# Patient Record
Sex: FEMALE | Employment: OTHER | URBAN - METROPOLITAN AREA
[De-identification: names, ages, dates, MRNs, and addresses within clinical notes are randomized per-mention and may not be internally consistent; named-entity substitution may affect disease eponyms.]

---

## 2017-05-30 ENCOUNTER — HOSPITAL ENCOUNTER (EMERGENCY)
Age: 77
Discharge: HOME | End: 2017-05-30
Attending: EMERGENCY MEDICINE
Payer: MEDICARE

## 2017-05-30 ENCOUNTER — APPOINTMENT (OUTPATIENT)
Dept: CT IMAGING | Age: 77
End: 2017-05-30
Attending: EMERGENCY MEDICINE
Payer: MEDICARE

## 2017-05-30 VITALS
RESPIRATION RATE: 17 BRPM | HEIGHT: 60 IN | TEMPERATURE: 98.3 F | BODY MASS INDEX: 29.84 KG/M2 | OXYGEN SATURATION: 98 % | DIASTOLIC BLOOD PRESSURE: 84 MMHG | WEIGHT: 152 LBS | SYSTOLIC BLOOD PRESSURE: 160 MMHG | HEART RATE: 88 BPM

## 2017-05-30 DIAGNOSIS — S20.219A CONTUSION OF CHEST WALL, UNSPECIFIED LATERALITY, INITIAL ENCOUNTER: Primary | ICD-10-CM

## 2017-05-30 LAB
ANION GAP BLD CALC-SCNC: 14 MMOL/L (ref 5–15)
ATRIAL RATE: 87 BPM
BUN BLD-MCNC: 27 MG/DL (ref 9–20)
CA-I BLD-MCNC: 1.09 MMOL/L (ref 1.12–1.32)
CALCULATED P AXIS, ECG09: 0 DEGREES
CALCULATED R AXIS, ECG10: 20 DEGREES
CALCULATED T AXIS, ECG11: -154 DEGREES
CHLORIDE BLD-SCNC: 103 MMOL/L (ref 98–107)
CO2 BLD-SCNC: 27 MMOL/L (ref 21–32)
CREAT BLD-MCNC: 0.9 MG/DL (ref 0.6–1.3)
DIAGNOSIS, 93000: NORMAL
GLUCOSE BLD-MCNC: 130 MG/DL (ref 65–100)
HCT VFR BLD CALC: 38 % (ref 35–47)
HGB BLD-MCNC: 12.9 GM/DL (ref 11.5–16)
P-R INTERVAL, ECG05: 152 MS
POTASSIUM BLD-SCNC: 5.3 MMOL/L (ref 3.5–5.1)
Q-T INTERVAL, ECG07: 362 MS
QRS DURATION, ECG06: 70 MS
QTC CALCULATION (BEZET), ECG08: 435 MS
SERVICE CMNT-IMP: ABNORMAL
SODIUM BLD-SCNC: 138 MMOL/L (ref 136–145)
TROPONIN I SERPL-MCNC: <0.04 NG/ML
VENTRICULAR RATE, ECG03: 87 BPM

## 2017-05-30 PROCEDURE — 71260 CT THORAX DX C+: CPT

## 2017-05-30 PROCEDURE — 74011250636 HC RX REV CODE- 250/636: Performed by: EMERGENCY MEDICINE

## 2017-05-30 PROCEDURE — 74011000258 HC RX REV CODE- 258: Performed by: EMERGENCY MEDICINE

## 2017-05-30 PROCEDURE — 80047 BASIC METABLC PNL IONIZED CA: CPT

## 2017-05-30 PROCEDURE — 36415 COLL VENOUS BLD VENIPUNCTURE: CPT | Performed by: EMERGENCY MEDICINE

## 2017-05-30 PROCEDURE — 93005 ELECTROCARDIOGRAM TRACING: CPT

## 2017-05-30 PROCEDURE — 84484 ASSAY OF TROPONIN QUANT: CPT | Performed by: EMERGENCY MEDICINE

## 2017-05-30 PROCEDURE — 74011636320 HC RX REV CODE- 636/320: Performed by: EMERGENCY MEDICINE

## 2017-05-30 PROCEDURE — 99284 EMERGENCY DEPT VISIT MOD MDM: CPT

## 2017-05-30 RX ORDER — SODIUM CHLORIDE 0.9 % (FLUSH) 0.9 %
10 SYRINGE (ML) INJECTION
Status: COMPLETED | OUTPATIENT
Start: 2017-05-30 | End: 2017-05-30

## 2017-05-30 RX ORDER — SODIUM CHLORIDE 9 MG/ML
1000 INJECTION, SOLUTION INTRAVENOUS ONCE
Status: COMPLETED | OUTPATIENT
Start: 2017-05-30 | End: 2017-05-30

## 2017-05-30 RX ADMIN — SODIUM CHLORIDE 100 ML: 900 INJECTION, SOLUTION INTRAVENOUS at 15:47

## 2017-05-30 RX ADMIN — IOPAMIDOL 100 ML: 612 INJECTION, SOLUTION INTRAVENOUS at 15:47

## 2017-05-30 RX ADMIN — SODIUM CHLORIDE 1000 ML: 900 INJECTION, SOLUTION INTRAVENOUS at 15:21

## 2017-05-30 RX ADMIN — Medication 10 ML: at 15:47
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
## 2017-05-30 NOTE — ED NOTES
Patient verbalizes understanding of discharge instructions. Ambulatory and in no acute distress at discharge. Accompanied by female .

## 2017-08-23 PROBLEM — I77.9 UNILATERAL CAROTID ARTERY DISEASE (HCC): Status: ACTIVE | Noted: 2017-01-01

## 2017-10-12 ENCOUNTER — APPOINTMENT (OUTPATIENT)
Dept: MRI IMAGING | Age: 77
End: 2017-10-12
Attending: INTERNAL MEDICINE
Payer: MEDICARE

## 2017-10-12 ENCOUNTER — APPOINTMENT (OUTPATIENT)
Dept: CT IMAGING | Age: 77
End: 2017-10-12
Attending: EMERGENCY MEDICINE
Payer: MEDICARE

## 2017-10-12 ENCOUNTER — HOSPITAL ENCOUNTER (OUTPATIENT)
Age: 77
Setting detail: OBSERVATION
Discharge: HOME | End: 2017-10-13
Attending: EMERGENCY MEDICINE | Admitting: INTERNAL MEDICINE
Payer: MEDICARE

## 2017-10-12 DIAGNOSIS — H81.11 BENIGN PAROXYSMAL POSITIONAL VERTIGO OF RIGHT EAR: ICD-10-CM

## 2017-10-12 DIAGNOSIS — R03.0 ELEVATED BLOOD PRESSURE READING: ICD-10-CM

## 2017-10-12 DIAGNOSIS — R51.9 NONINTRACTABLE HEADACHE, UNSPECIFIED CHRONICITY PATTERN, UNSPECIFIED HEADACHE TYPE: ICD-10-CM

## 2017-10-12 DIAGNOSIS — R42 VERTIGO: Primary | ICD-10-CM

## 2017-10-12 PROBLEM — I15.2 HYPERTENSION COMPLICATING DIABETES (HCC): Status: ACTIVE | Noted: 2017-10-12

## 2017-10-12 PROBLEM — E11.8 TYPE 2 DIABETES MELLITUS WITH COMPLICATION (HCC): Status: ACTIVE | Noted: 2017-10-12

## 2017-10-12 PROBLEM — E11.59 HYPERTENSION COMPLICATING DIABETES (HCC): Status: ACTIVE | Noted: 2017-10-12

## 2017-10-12 LAB
ALBUMIN SERPL-MCNC: 4.1 G/DL (ref 3.5–5)
ALBUMIN/GLOB SERPL: 0.9 {RATIO} (ref 1.1–2.2)
ALP SERPL-CCNC: 73 U/L (ref 45–117)
ALT SERPL-CCNC: 21 U/L (ref 12–78)
ANION GAP SERPL CALC-SCNC: 8 MMOL/L (ref 5–15)
AST SERPL-CCNC: 21 U/L (ref 15–37)
ATRIAL RATE: 87 BPM
BASOPHILS # BLD: 0 K/UL (ref 0–0.1)
BASOPHILS NFR BLD: 0 % (ref 0–1)
BILIRUB SERPL-MCNC: 0.6 MG/DL (ref 0.2–1)
BUN SERPL-MCNC: 14 MG/DL (ref 6–20)
BUN/CREAT SERPL: 16 (ref 12–20)
CALCIUM SERPL-MCNC: 9.4 MG/DL (ref 8.5–10.1)
CALCULATED P AXIS, ECG09: 63 DEGREES
CALCULATED R AXIS, ECG10: 47 DEGREES
CALCULATED T AXIS, ECG11: -154 DEGREES
CHLORIDE SERPL-SCNC: 103 MMOL/L (ref 97–108)
CO2 SERPL-SCNC: 29 MMOL/L (ref 21–32)
CREAT SERPL-MCNC: 0.9 MG/DL (ref 0.55–1.02)
DIAGNOSIS, 93000: NORMAL
DIFFERENTIAL METHOD BLD: ABNORMAL
EOSINOPHIL # BLD: 0 K/UL (ref 0–0.4)
EOSINOPHIL NFR BLD: 1 % (ref 0–7)
ERYTHROCYTE [DISTWIDTH] IN BLOOD BY AUTOMATED COUNT: 13 % (ref 11.5–14.5)
GLOBULIN SER CALC-MCNC: 4.4 G/DL (ref 2–4)
GLUCOSE BLD STRIP.AUTO-MCNC: 166 MG/DL (ref 65–100)
GLUCOSE SERPL-MCNC: 157 MG/DL (ref 65–100)
HCT VFR BLD AUTO: 40 % (ref 35–47)
HGB BLD-MCNC: 12.2 G/DL (ref 11.5–16)
LYMPHOCYTES # BLD: 0.6 K/UL (ref 0.8–3.5)
LYMPHOCYTES NFR BLD: 14 % (ref 12–49)
MCH RBC QN AUTO: 24 PG (ref 26–34)
MCHC RBC AUTO-ENTMCNC: 30.5 G/DL (ref 30–36.5)
MCV RBC AUTO: 78.6 FL (ref 80–99)
MONOCYTES # BLD: 0.1 K/UL (ref 0–1)
MONOCYTES NFR BLD: 3 % (ref 5–13)
NEUTS SEG # BLD: 3.7 K/UL (ref 1.8–8)
NEUTS SEG NFR BLD: 82 % (ref 32–75)
P-R INTERVAL, ECG05: 150 MS
PLATELET # BLD AUTO: 457 K/UL (ref 150–400)
PLATELET COMMENTS,PCOM: ABNORMAL
POTASSIUM SERPL-SCNC: 4.1 MMOL/L (ref 3.5–5.1)
PROT SERPL-MCNC: 8.5 G/DL (ref 6.4–8.2)
Q-T INTERVAL, ECG07: 348 MS
QRS DURATION, ECG06: 74 MS
QTC CALCULATION (BEZET), ECG08: 418 MS
RBC # BLD AUTO: 5.09 M/UL (ref 3.8–5.2)
RBC MORPH BLD: ABNORMAL
SERVICE CMNT-IMP: ABNORMAL
SODIUM SERPL-SCNC: 140 MMOL/L (ref 136–145)
TROPONIN I SERPL-MCNC: <0.04 NG/ML
VENTRICULAR RATE, ECG03: 87 BPM
WBC # BLD AUTO: 4.4 K/UL (ref 3.6–11)

## 2017-10-12 PROCEDURE — 93005 ELECTROCARDIOGRAM TRACING: CPT

## 2017-10-12 PROCEDURE — 82962 GLUCOSE BLOOD TEST: CPT

## 2017-10-12 PROCEDURE — 96374 THER/PROPH/DIAG INJ IV PUSH: CPT

## 2017-10-12 PROCEDURE — 70450 CT HEAD/BRAIN W/O DYE: CPT

## 2017-10-12 PROCEDURE — 80053 COMPREHEN METABOLIC PANEL: CPT | Performed by: EMERGENCY MEDICINE

## 2017-10-12 PROCEDURE — 70544 MR ANGIOGRAPHY HEAD W/O DYE: CPT

## 2017-10-12 PROCEDURE — 96361 HYDRATE IV INFUSION ADD-ON: CPT

## 2017-10-12 PROCEDURE — 82306 VITAMIN D 25 HYDROXY: CPT | Performed by: INTERNAL MEDICINE

## 2017-10-12 PROCEDURE — 85025 COMPLETE CBC W/AUTO DIFF WBC: CPT | Performed by: EMERGENCY MEDICINE

## 2017-10-12 PROCEDURE — 70553 MRI BRAIN STEM W/O & W/DYE: CPT

## 2017-10-12 PROCEDURE — 74011250636 HC RX REV CODE- 250/636: Performed by: INTERNAL MEDICINE

## 2017-10-12 PROCEDURE — 84484 ASSAY OF TROPONIN QUANT: CPT | Performed by: EMERGENCY MEDICINE

## 2017-10-12 PROCEDURE — 99218 HC RM OBSERVATION: CPT

## 2017-10-12 PROCEDURE — 99284 EMERGENCY DEPT VISIT MOD MDM: CPT

## 2017-10-12 PROCEDURE — A9576 INJ PROHANCE MULTIPACK: HCPCS | Performed by: EMERGENCY MEDICINE

## 2017-10-12 PROCEDURE — 74011250636 HC RX REV CODE- 250/636: Performed by: EMERGENCY MEDICINE

## 2017-10-12 PROCEDURE — 36415 COLL VENOUS BLD VENIPUNCTURE: CPT | Performed by: EMERGENCY MEDICINE

## 2017-10-12 PROCEDURE — 93880 EXTRACRANIAL BILAT STUDY: CPT

## 2017-10-12 PROCEDURE — 96375 TX/PRO/DX INJ NEW DRUG ADDON: CPT

## 2017-10-12 PROCEDURE — 74011250637 HC RX REV CODE- 250/637: Performed by: EMERGENCY MEDICINE

## 2017-10-12 RX ORDER — KETOROLAC TROMETHAMINE 30 MG/ML
15 INJECTION, SOLUTION INTRAMUSCULAR; INTRAVENOUS
Status: COMPLETED | OUTPATIENT
Start: 2017-10-12 | End: 2017-10-12

## 2017-10-12 RX ORDER — MECLIZINE HCL 12.5 MG 12.5 MG/1
25 TABLET ORAL
Status: COMPLETED | OUTPATIENT
Start: 2017-10-12 | End: 2017-10-12

## 2017-10-12 RX ORDER — ACETAMINOPHEN 325 MG/1
650 TABLET ORAL
Status: DISCONTINUED | OUTPATIENT
Start: 2017-10-12 | End: 2017-10-13 | Stop reason: HOSPADM

## 2017-10-12 RX ORDER — RAMIPRIL 2.5 MG/1
20 CAPSULE ORAL DAILY
Status: DISCONTINUED | OUTPATIENT
Start: 2017-10-13 | End: 2017-10-13 | Stop reason: HOSPADM

## 2017-10-12 RX ORDER — ONDANSETRON 2 MG/ML
4 INJECTION INTRAMUSCULAR; INTRAVENOUS
Status: COMPLETED | OUTPATIENT
Start: 2017-10-12 | End: 2017-10-12

## 2017-10-12 RX ORDER — INSULIN LISPRO 100 [IU]/ML
INJECTION, SOLUTION INTRAVENOUS; SUBCUTANEOUS
Status: DISCONTINUED | OUTPATIENT
Start: 2017-10-12 | End: 2017-10-13 | Stop reason: HOSPADM

## 2017-10-12 RX ORDER — ACETAMINOPHEN 500 MG
1000 TABLET ORAL
Status: COMPLETED | OUTPATIENT
Start: 2017-10-12 | End: 2017-10-12

## 2017-10-12 RX ORDER — GARLIC 1000 MG
1000 CAPSULE ORAL DAILY
COMMUNITY
End: 2017-10-19

## 2017-10-12 RX ORDER — MECLIZINE HCL 12.5 MG 12.5 MG/1
25 TABLET ORAL
Status: DISCONTINUED | OUTPATIENT
Start: 2017-10-12 | End: 2017-10-13 | Stop reason: HOSPADM

## 2017-10-12 RX ORDER — SODIUM CHLORIDE 9 MG/ML
100 INJECTION, SOLUTION INTRAVENOUS CONTINUOUS
Status: DISCONTINUED | OUTPATIENT
Start: 2017-10-12 | End: 2017-10-13

## 2017-10-12 RX ORDER — SODIUM CHLORIDE 0.9 % (FLUSH) 0.9 %
5-10 SYRINGE (ML) INJECTION EVERY 8 HOURS
Status: DISCONTINUED | OUTPATIENT
Start: 2017-10-12 | End: 2017-10-13 | Stop reason: HOSPADM

## 2017-10-12 RX ORDER — SODIUM CHLORIDE 0.9 % (FLUSH) 0.9 %
5-10 SYRINGE (ML) INJECTION AS NEEDED
Status: DISCONTINUED | OUTPATIENT
Start: 2017-10-12 | End: 2017-10-13 | Stop reason: HOSPADM

## 2017-10-12 RX ADMIN — ACETAMINOPHEN 1000 MG: 500 TABLET, FILM COATED ORAL at 14:51

## 2017-10-12 RX ADMIN — Medication 10 ML: at 21:51

## 2017-10-12 RX ADMIN — GADOTERIDOL 14 ML: 279.3 INJECTION, SOLUTION INTRAVENOUS at 19:45

## 2017-10-12 RX ADMIN — SODIUM CHLORIDE 100 ML/HR: 900 INJECTION, SOLUTION INTRAVENOUS at 21:51

## 2017-10-12 RX ADMIN — SODIUM CHLORIDE 1000 ML: 900 INJECTION, SOLUTION INTRAVENOUS at 13:15

## 2017-10-12 RX ADMIN — KETOROLAC TROMETHAMINE 15 MG: 30 INJECTION, SOLUTION INTRAMUSCULAR at 13:50

## 2017-10-12 RX ADMIN — MECLIZINE 25 MG: 12.5 TABLET ORAL at 13:15

## 2017-10-12 RX ADMIN — ONDANSETRON 4 MG: 2 INJECTION INTRAMUSCULAR; INTRAVENOUS at 13:15

## 2017-10-12 NOTE — IP AVS SNAPSHOT
[address] [facility]. [address] 
[phone] Patient: Larry Goddard MRN: [id number] FMX:[id number] Current Discharge Medication List  
  
ASK your doctor about these medications Dose & Instructions Dispensing Information Comments Morning Noon Evening Bedtime  
 garlic 4,544 mg Cap Your last dose was: Your next dose is:    
   
   
 Dose:  1000 mg Take 1,000 mg by mouth daily. Refills:  0  
     
   
   
   
  
 metFORMIN 500 mg tablet Commonly known as:  GLUCOPHAGE Your last dose was: Your next dose is:    
   
   
 Dose:  500 mg Take 1 Tab by mouth daily (with breakfast). Quantity:  30 Tab Refills:  12  
     
   
   
   
  
 ramipril 10 mg capsule Commonly known as:  ALTACE Your last dose was: Your next dose is:    
   
   
 Dose:  20 mg Take 2 Caps by mouth daily. Quantity:  60 Cap Refills:  12

## 2017-10-12 NOTE — ED PROVIDER NOTES
HPI Comments: 77yo F with pmh sig for DM, htn who presents with vertigo. Started last night. Balance off and nauseated. Symptoms worse when moving head. No vomiting.  + headache. No fever, chest pain, sob, ear pain, changing in hearing. Never had symptoms like this before. No medications taken at home for these symptoms. Patient is a 69 y.o. female presenting with dizziness, headaches, fatigue, and nausea. The history is provided by the patient and a friend. Dizziness   Associated symptoms include headaches and nausea. Pertinent negatives include no chest pain. Headache    Associated symptoms include dizziness and nausea. Pertinent negatives include no fever. Fatigue   Associated symptoms include headaches and nausea. Pertinent negatives include no chest pain. Nausea    Associated symptoms include headaches and headaches. Pertinent negatives include no fever, no abdominal pain and no arthralgias. Past Medical History:   Diagnosis Date    Diabetes ([facility] [address]     Hypertension     Unilateral carotid artery disease ([facility] [address] 2017    50-79 % stenosis Left ICA . Past Surgical History:   Procedure Laterality Date    HX GYN      Hysterectomy         History reviewed. No pertinent family history. Social History     Social History    Marital status: UNKNOWN     Spouse name: N/A    Number of children: N/A    Years of education: N/A     Occupational History    Not on file. Social History Main Topics    Smoking status: Never Smoker    Smokeless tobacco: Never Used    Alcohol use No    Drug use: No    Sexual activity: Not on file     Other Topics Concern    Not on file     Social History Narrative         ALLERGIES: Lipitor [atorvastatin]    Review of Systems   Constitutional: Positive for fatigue. Negative for fever. HENT: Negative for facial swelling. Eyes: Negative for visual disturbance. Respiratory: Negative for chest tightness. Cardiovascular: Negative for chest pain. Gastrointestinal: Positive for nausea. Negative for abdominal pain. Genitourinary: Negative for dysuria. Musculoskeletal: Negative for arthralgias. Skin: Negative for rash. Neurological: Positive for dizziness and headaches. Hematological: Negative for adenopathy. Psychiatric/Behavioral: Negative for suicidal ideas. Vitals:    10/12/17 1201   BP: 173/81   Pulse: 86   Resp: 18   Temp: 98.3 °F (36.8 °C)   SpO2: 96%   Weight: 72.6 kg (160 lb)   Height: 5' (1.524 m)            Physical Exam   Constitutional: She is oriented to person, place, and time. She appears well-developed and well-nourished. No distress. HENT:   Head: Normocephalic and atraumatic. Mouth/Throat: Oropharynx is clear and moist.   Eyes: Pupils are equal, round, and reactive to light. No scleral icterus. Neck: Normal range of motion. Neck supple. No thyromegaly present. Cardiovascular: Normal rate, regular rhythm, normal heart sounds and intact distal pulses. No murmur heard. Pulmonary/Chest: Effort normal and breath sounds normal. No respiratory distress. Abdominal: Soft. Bowel sounds are normal. She exhibits no distension. There is no tenderness. Musculoskeletal: Normal range of motion. She exhibits no edema. Neurological: She is alert and oriented to person, place, and time. Mental Status:  Oriented to time, place and person. Cranial Nerves: Intact visual fields. Fundi are benign. PERRL, EOM's full and intact, no nystagmus, no ptosis. Facial sensation is normal. Facial movement is symmetric. Palate is midline with normal sternocleidomastoid and trapezius muscle strength. Tongue is midline. Motor:  5/5 strength in upper and lower proximal and distal muscles. Normal bulk and tone. Reflexes:  Biceps and quadriceps tendon reflexes 2+ and symmetrical.     Sensory:  Normal to light touch    Gait:   Normal gait. Cerebellar:  Normal finger-to-nose and heal to shin. Negative Romberg.      Skin: Skin is warm and dry. No rash noted. She is not diaphoretic. Nursing note and vitals reviewed. MDM  Number of Diagnoses or Management Options  Diagnosis management comments: A:  77yo F with vertigo. Most likely peripheral etiology. No other CNS symptoms to suggest central cause and symptoms worsened with movement which support peripheral cause. VS stable in ED. DDx:  veritigo (peripheral vs central), dehydration, electrolyte abnormality, arrhythmia    P:  ecg  Head ct  Labs  ivf  Zofran/meclizine/toradol    ED Course       Procedures    ED EKG interpretation:  Rhythm: normal sinus rhythm. Rate (approx.): 87.  Axis: normal.  ST segment:  No concerning ST elevations or depressions. This EKG was interpreted by Carlos Dunbar MD,ED Provider. Head CT: IMPRESSION: No acute process. Labs unremarkable. 2:48 PM  Pt with no improvement in symptoms. BP remains elevated. Will give tylenol for headache. I discussed with Dr. Esmre Da Silva who will eval patient for admission.

## 2017-10-12 NOTE — PROGRESS NOTES
Vertigo not responding to IVF , Meclizine. Referred for Observation . Orders placed.  I will see this evening

## 2017-10-12 NOTE — H&P
[address]   174 [facility], [address] [place] [address]   HISTORY AND PHYSICAL       Name:  Nery Zayas   MR#:  [id number]   :  1940   Account #:  [de-identified]        Date of Adm:  10/12/2017       CHIEF COMPLAINT: A 78-year-old black female referred to   observation for evaluation of vertigo. HISTORY OF PRESENT ILLNESS: The patient has hypertension, type   2 diabetes mellitus. She awoke yesterday morning with vertigo, tried to   eat a bland diet and vomited associated with development of a   headache which persists. She came to the ER and was given IV fluids   and meclizine and continues to feel have vertigo . Head CT was negative. She is referred to observation for further evaluation and treatment. PAST MEDICAL HISTORY: Type 2 diabetes mellitus, hypertension   complicating diabetes. carotid artery disease with a    carotid Doppler showing 50-79% left internal carotid artery stenosis. Episode of syncope May , 2017 of unclear etiology. Then , she   was taking her blood pressure medication incorrectly. Sciatica. PAST SURGICAL HISTORY: Hysterectomy. MEDICATIONS   1. Ramipril 20 mg daily. 2. Garlic 0166 mg daily. 3. Metformin 500 mg daily. ADVERSE DRUG REACTIONS: LIPITOR (LEG CRAMPS). FAMILY HISTORY: Noncontributory. SOCIAL HISTORY: Never a smoker, negative ETOH. REVIEW OF SYSTEMS: She denies acute focal neurologic symptoms. She had one episode of near syncope during the past 36 hours. Denies chest pain, palpitations, head injury, loss of consciousness,   sudden hearing loss. She denies rectal bleeding. PHYSICAL EXAMINATION   VITAL SIGNS: She is not orthostatic. Systolic blood pressure 182 lying,   189 standing, with pulse remaining stable in the 80-81 range, this is   after having received a liter of fluid. Temperature 98.3, earlier pulse was   86, /81, RR 18 and 96% saturated. HEENT: Ears: TMs clear. No nystagmus, PERRLA.    NECK: No carotid bruits heard. Neck is supple, 2+ temporal arteries,   nontender. LUNGS: Clear. HEART: Regular without murmur, gallop, or rub. ABDOMEN: Negative. EXTREMITIES: No pedal edema. No pedal lesions seen, 2+ DP   pulses. Light touch sensation intact in her feet. NEUROLOGIC: Borderline Romberg. Negative drift. Gait is mildly   ataxic. Otherwise, neurologic exam is nonfocal.    LABORATORY DATA: Urinalysis pending. Glucose 157, BUN 14,   creatinine 0.9, total protein 8.5, albumin 4.1, globulin 4.4. Remainder of   CMP is normal. WBC 4.4, hemoglobin 12.2, and platelet count 942   with 87% neutrophils, lymphocytes, 3% monocytes. Troponin I   negative. EKG: NSR, nonspecific T-wave change, no significant   change versus 05/30/2017 EKG. IMPRESSION   1. Acute vertigo. Under evaluation. She is ataxic. Plan: Refer   to observation for a brain MRI, MRA, carotid Doppler. 2. History of carotid artery disease. 3. Type 2 diabetes mellitus. 4. Hypertension. She did not have her ramipril today. Will hydrate and   follow fingerstick blood sugars.         MD GURPREET [facility]   D:  10/12/2017   18:18   T:  10/12/2017   19:02   Job #:  [id number]

## 2017-10-12 NOTE — PROGRESS NOTES
Admission Medication Reconciliation:    Information obtained from:  Patient, RxQuery, chart review    Comments/Recommendations: All medications/allergies have been reviewed and updated; last medication administration times reviewed and recorded. The patient was a good historian and appears to be compliant with her medications. Changes made to Prior to Admission (PTA) Medication List:   ?   Medications Added:   - None   ? Medications Changed:   - None   ? Medications Removed:   - ergocalciferol 50,000 units/week  - quinine 324 mg QHS prn leg cramos         Significant PMH/Disease States:   Past Medical History:   Diagnosis Date    [facility]     Hypertension     Unilateral carotid artery disease ([facility] 2017    50-79 % stenosis Left ICA . Chief Complaint for this Admission:    Chief Complaint   Patient presents with    Dizziness    Headache    Fatigue    Nausea       Allergies:  Lipitor [atorvastatin]    Prior to Admission Medications:   Prior to Admission Medications   Prescriptions Last Dose Informant Patient Reported? Taking?   garlic 4,205 mg cap 84/52/2208 at Unknown time  Yes Yes   Sig: Take 1,000 mg by mouth daily. metFORMIN (GLUCOPHAGE) 500 mg tablet 10/11/2017 at Unknown time  No Yes   Sig: Take 1 Tab by mouth daily (with breakfast). ramipril (ALTACE) 10 mg capsule 10/11/2017 at Unknown time  No Yes   Sig: Take 2 Caps by mouth daily. Facility-Administered Medications: None       Thank you for allowing pharmacy to participate in the coordination of this patient's care. If you have any other questions, please contact the medication reconciliation pharmacist at [phone]. Bucky Bragg, Pharm. D.

## 2017-10-12 NOTE — IP AVS SNAPSHOT
[address] 
[phone] Patient: Nimesh Fuller MRN: [id number] LYP:1/63/7051 You are allergic to the following Allergen Reactions Lipitor (Atorvastatin) Other (comments) Leg cramps Recent Documentation Height Weight BMI OB Status Smoking Status 1.524 m 72.6 kg 31.25 kg/m2 Hysterectomy Never Smoker Emergency Contacts Name Discharge Info Relation Home Work Mobile Juliano Goodman [5] [phone] Jcarlos Yaya [5] [phone] About your hospitalization You were admitted on:  October 12, 2017 You last received care in the:  [facility] 5 OBSERVATION You were discharged on:  October 13, 2017 Unit phone number:  [phone] Why you were hospitalized Your primary diagnosis was:  Not on File Your diagnoses also included:  Vertigo, Hypertension Complicating Diabetes (Hcc), Type 2 Diabetes Mellitus With Complication (Hcc), Unilateral Carotid Artery Disease (Hcc) Providers Seen During Your Hospitalizations Provider Role Specialty Primary office phone Moraima Small MD Attending Provider Emergency Medicine [phone] Romie Payne MD Attending Provider Internal Medicine [phone] Your Primary Care Physician (PCP) Primary Care Physician Office Phone Office Fax [address] [phone] Follow-up Information Follow up With Details Comments Contact Info [place]ie [place]   [id number] [address] 
[phone] Current Discharge Medication List  
  
START taking these medications Dose & Instructions Dispensing Information Comments Morning Noon Evening Bedtime  
 aspirin delayed-release 81 mg tablet Your last dose was: Your next dose is:    
   
   
 Dose:  81 mg Take 1 Tab by mouth daily. Quantity:  30 Tab Refills:  11  
     
   
   
   
  
 meclizine 12.5 mg tablet Commonly known as:  ANTIVERT Your last dose was: Your next dose is:    
   
   
 Dose:  12.5 mg Take 1 Tab by mouth three (3) times daily as needed. For dizziness Quantity:  15 Tab Refills:  0  
     
   
   
   
  
 pravastatin 10 mg tablet Commonly known as:  PRAVACHOL Your last dose was: Your next dose is:    
   
   
 Dose:  10 mg Take 1 Tab by mouth every Monday and Friday. Quantity:  10 Tab Refills:  11 CONTINUE these medications which have NOT CHANGED Dose & Instructions Dispensing Information Comments Morning Noon Evening Bedtime  
 garlic 3,279 mg Cap Your last dose was: Your next dose is:    
   
   
 Dose:  1000 mg Take 1,000 mg by mouth daily. Refills:  0  
     
   
   
   
  
 metFORMIN 500 mg tablet Commonly known as:  GLUCOPHAGE Your last dose was: Your next dose is:    
   
   
 Dose:  500 mg Take 1 Tab by mouth daily (with breakfast). Quantity:  30 Tab Refills:  12  
     
   
   
   
  
 ramipril 10 mg capsule Commonly known as:  ALTACE Your last dose was: Your next dose is:    
   
   
 Dose:  20 mg Take 2 Caps by mouth daily. Quantity:  60 Cap Refills:  12 Where to Get Your Medications These medications were sent to [address] [place] [address] [address]  [facility] [address]. [address] [place] [address], [address] [place] [address] Hours:  24-hours Phone:  [phone]  
  aspirin delayed-release 81 mg tablet  
 meclizine 12.5 mg tablet  
 pravastatin 10 mg tablet Discharge Instructions Patient Discharge Instructions Chato Aguirre / [id number] : 1940 Admitted 10/12/2017 Discharged: 10/13/2017 Take Home Medications · It is important that you take the medication exactly as they are prescribed. · Keep your medication in the bottles provided by the pharmacist and keep a list of the medication names, dosages, and times to be taken in your wallet. · Do not take other medications without consulting your doctor. What to do at [facility] Recommended diet: Diabetic Recommended activity: gradually increase activity. When you are feeling well without dizziness, you may resume driving . The first time out driving, have another person in the car If you experience any of the following symptoms : dizziness which does not respond to Meclizine , please follow up with Dr. Hernandez Signs at [phone] Follow-up with Dr. Cierra Bhatti in 3 to 5 days in his office. Call [phone] to make an appointment. Information obtained by : 
I understand that if any problems occur once I am at home I am to contact my physician. I understand and acknowledge receipt of the instructions indicated above. Physician's or R.N.'s Signature                                                                  Date/Time Patient or Representative Signature                                                          Date/Time Discharge Orders None Introducing [facility]! Stephanie Pham introduces "[employer] patient portal. Now you can access parts of your medical record, email your doctor's office, and request medication refills online. 1. In your internet browser, go to [email or web address] 2. Click on the First Time User? Click Here link in the Sign In box. You will see the New Member Sign Up page. 3. Enter your [employer] Access Code exactly as it appears below. You will not need to use this code after youve completed the sign-up process. If you do not sign up before the expiration date, you must request a new code. · [employer] Access Code: [email or web address] Expires: 11/21/2017  3:54 PM 
 
4. Enter the last four digits of your Social Security Number (xxxx) and Date of Birth (mm/dd/yyyy) as indicated and click Submit. You will be taken to the next sign-up page. 5. Create a [employer] ID. This will be your [employer] login ID and cannot be changed, so think of one that is secure and easy to remember. 6. Create a [employer] password. You can change your password at any time. 7. Enter your Password Reset Question and Answer. This can be used at a later time if you forget your password. 8. Enter your e-mail address. You will receive e-mail notification when new information is available in [address] 9. Click Sign Up. You can now view and download portions of your medical record. 10. Click the Download Summary menu link to download a portable copy of your medical information. If you have questions, please visit the Frequently Asked Questions section of the [employer] website. Remember, [employer] is NOT to be used for urgent needs. For medical emergencies, dial 911. Now available from your iPhone and Android! General Information Please provide this summary of care documentation to your next provider. Patient Signature:  ____________________________________________________________ Date:  ____________________________________________________________  
  
Albina Miranda Provider Signature:  ____________________________________________________________ Date:  ____________________________________________________________

## 2017-10-12 NOTE — ROUTINE PROCESS
TRANSFER - OUT REPORT:    Verbal report given to Jose Pham RN(name) on Liu Long  being transferred to [address](unit) for routine progression of care       Report consisted of patients Situation, Background, Assessment and   Recommendations(SBAR). Information from the following report(s) SBAR, ED Summary, OR Summary, Procedure Summary, Intake/Output, MAR and Recent Results was reviewed with the receiving nurse. Lines:   Peripheral IV 10/12/17 Right Antecubital (Active)   Site Assessment Clean, dry, & intact 10/12/2017 12:21 PM   Phlebitis Assessment 0 10/12/2017 12:21 PM   Infiltration Assessment 0 10/12/2017 12:21 PM   Dressing Status Clean, dry, & intact 10/12/2017 12:21 PM   Dressing Type Transparent 10/12/2017 12:21 PM   Hub Color/Line Status Pink;Flushed;Patent 10/12/2017 12:21 PM   Action Taken Blood drawn 10/12/2017 12:21 PM        Opportunity for questions and clarification was provided.       Patient transported with:  Transporter

## 2017-10-13 VITALS
BODY MASS INDEX: 31.41 KG/M2 | SYSTOLIC BLOOD PRESSURE: 162 MMHG | OXYGEN SATURATION: 98 % | RESPIRATION RATE: 18 BRPM | TEMPERATURE: 98.1 F | HEIGHT: 60 IN | WEIGHT: 160 LBS | HEART RATE: 72 BPM | DIASTOLIC BLOOD PRESSURE: 88 MMHG

## 2017-10-13 PROBLEM — I67.9 CEREBROVASCULAR DISEASE: Status: ACTIVE | Noted: 2017-10-13

## 2017-10-13 PROBLEM — H81.11 BENIGN PAROXYSMAL POSITIONAL VERTIGO OF RIGHT EAR: Status: ACTIVE | Noted: 2017-10-13

## 2017-10-13 LAB
25(OH)D3 SERPL-MCNC: 29.5 NG/ML (ref 30–100)
ANION GAP SERPL CALC-SCNC: 8 MMOL/L (ref 5–15)
APPEARANCE UR: ABNORMAL
BACTERIA URNS QL MICRO: NEGATIVE /HPF
BILIRUB UR QL: NEGATIVE
BUN SERPL-MCNC: 16 MG/DL (ref 6–20)
BUN/CREAT SERPL: 22 (ref 12–20)
CALCIUM SERPL-MCNC: 8.2 MG/DL (ref 8.5–10.1)
CHLORIDE SERPL-SCNC: 108 MMOL/L (ref 97–108)
CHOLEST SERPL-MCNC: 169 MG/DL
CO2 SERPL-SCNC: 27 MMOL/L (ref 21–32)
COLOR UR: ABNORMAL
CREAT SERPL-MCNC: 0.74 MG/DL (ref 0.55–1.02)
EPITH CASTS URNS QL MICRO: ABNORMAL /LPF
ERYTHROCYTE [DISTWIDTH] IN BLOOD BY AUTOMATED COUNT: 13.3 % (ref 11.5–14.5)
GLUCOSE BLD STRIP.AUTO-MCNC: 106 MG/DL (ref 65–100)
GLUCOSE BLD STRIP.AUTO-MCNC: 122 MG/DL (ref 65–100)
GLUCOSE SERPL-MCNC: 104 MG/DL (ref 65–100)
GLUCOSE UR STRIP.AUTO-MCNC: NEGATIVE MG/DL
HCT VFR BLD AUTO: 33 % (ref 35–47)
HDLC SERPL-MCNC: 37 MG/DL
HDLC SERPL: 4.6 {RATIO} (ref 0–5)
HGB BLD-MCNC: 10.1 G/DL (ref 11.5–16)
HGB UR QL STRIP: NEGATIVE
HYALINE CASTS URNS QL MICRO: ABNORMAL /LPF (ref 0–5)
KETONES UR QL STRIP.AUTO: NEGATIVE MG/DL
LDLC SERPL CALC-MCNC: 105.8 MG/DL (ref 0–100)
LEUKOCYTE ESTERASE UR QL STRIP.AUTO: ABNORMAL
LIPID PROFILE,FLP: ABNORMAL
MCH RBC QN AUTO: 24 PG (ref 26–34)
MCHC RBC AUTO-ENTMCNC: 30.6 G/DL (ref 30–36.5)
MCV RBC AUTO: 78.6 FL (ref 80–99)
NITRITE UR QL STRIP.AUTO: NEGATIVE
PH UR STRIP: 5.5 [PH] (ref 5–8)
PLATELET # BLD AUTO: 372 K/UL (ref 150–400)
POTASSIUM SERPL-SCNC: 3.7 MMOL/L (ref 3.5–5.1)
PROT UR STRIP-MCNC: NEGATIVE MG/DL
RBC # BLD AUTO: 4.2 M/UL (ref 3.8–5.2)
RBC #/AREA URNS HPF: ABNORMAL /HPF (ref 0–5)
SERVICE CMNT-IMP: ABNORMAL
SERVICE CMNT-IMP: ABNORMAL
SODIUM SERPL-SCNC: 143 MMOL/L (ref 136–145)
SP GR UR REFRACTOMETRY: 1.02 (ref 1–1.03)
TRIGL SERPL-MCNC: 131 MG/DL (ref ?–150)
UR CULT HOLD, URHOLD: NORMAL
UROBILINOGEN UR QL STRIP.AUTO: 1 EU/DL (ref 0.2–1)
VLDLC SERPL CALC-MCNC: 26.2 MG/DL
WBC # BLD AUTO: 4.3 K/UL (ref 3.6–11)
WBC URNS QL MICRO: ABNORMAL /HPF (ref 0–4)

## 2017-10-13 PROCEDURE — 85027 COMPLETE CBC AUTOMATED: CPT | Performed by: INTERNAL MEDICINE

## 2017-10-13 PROCEDURE — 81001 URINALYSIS AUTO W/SCOPE: CPT | Performed by: INTERNAL MEDICINE

## 2017-10-13 PROCEDURE — 80048 BASIC METABOLIC PNL TOTAL CA: CPT | Performed by: INTERNAL MEDICINE

## 2017-10-13 PROCEDURE — 82962 GLUCOSE BLOOD TEST: CPT

## 2017-10-13 PROCEDURE — 36415 COLL VENOUS BLD VENIPUNCTURE: CPT | Performed by: INTERNAL MEDICINE

## 2017-10-13 PROCEDURE — 80061 LIPID PANEL: CPT | Performed by: INTERNAL MEDICINE

## 2017-10-13 PROCEDURE — 99218 HC RM OBSERVATION: CPT

## 2017-10-13 PROCEDURE — 74011250637 HC RX REV CODE- 250/637: Performed by: INTERNAL MEDICINE

## 2017-10-13 RX ORDER — ASPIRIN 81 MG/1
81 TABLET ORAL DAILY
Status: DISCONTINUED | OUTPATIENT
Start: 2017-10-13 | End: 2017-10-13 | Stop reason: HOSPADM

## 2017-10-13 RX ORDER — MECLIZINE HCL 12.5 MG 12.5 MG/1
12.5 TABLET ORAL
Qty: 15 TAB | Refills: 0 | Status: SHIPPED | OUTPATIENT
Start: 2017-10-13 | End: 2020-07-23 | Stop reason: SDUPTHER

## 2017-10-13 RX ORDER — ASPIRIN 81 MG/1
81 TABLET ORAL DAILY
Qty: 30 TAB | Refills: 11 | Status: SHIPPED | OUTPATIENT
Start: 2017-10-13 | End: 2018-01-15 | Stop reason: SDUPTHER

## 2017-10-13 RX ORDER — FAMOTIDINE 20 MG/1
20 TABLET, FILM COATED ORAL 2 TIMES DAILY
Status: DISCONTINUED | OUTPATIENT
Start: 2017-10-13 | End: 2017-10-13 | Stop reason: HOSPADM

## 2017-10-13 RX ORDER — PRAVASTATIN SODIUM 10 MG/1
10 TABLET ORAL
Qty: 10 TAB | Refills: 11 | Status: SHIPPED | OUTPATIENT
Start: 2017-10-13 | End: 2018-10-21 | Stop reason: SDUPTHER

## 2017-10-13 RX ADMIN — ASPIRIN 81 MG: 81 TABLET, COATED ORAL at 09:03

## 2017-10-13 RX ADMIN — Medication 10 ML: at 06:27

## 2017-10-13 RX ADMIN — FAMOTIDINE 20 MG: 20 TABLET, FILM COATED ORAL at 09:03

## 2017-10-13 RX ADMIN — RAMIPRIL 20 MG: 2.5 CAPSULE ORAL at 09:06

## 2017-10-13 NOTE — PROCEDURES
[facility]  *** FINAL REPORT ***    Name: Sammy Adrian  MRN: [id number]    Outpatient  : 29 Mar 1940  HIS Order #: [id number]  [id number] [facility] Visit #: [id number]  Date: 12 Oct 2017    TYPE OF TEST: Cerebrovascular Duplex    REASON FOR TEST  Dizziness/vertigo    Right Carotid:-             Proximal               Mid                 Distal  cm/s  Systolic  Diastolic  Systolic  Diastolic  Systolic  Diastolic  CCA:     88.8      15.0                            74.0      18.0  Bulb:  ECA:     66.0       7.0  ICA:    117.0      30.0                            96.0      28.0  ICA/CCA:  1.6       1.7    ICA Stenosis:    Right Vertebral:-  Finding: Antegrade  Sys:       84.0  Lizbeth:       16.0    Right Subclavian:    Left Carotid:-            Proximal                Mid                 Distal  cm/s  Systolic  Diastolic  Systolic  Diastolic  Systolic  Diastolic  CCA:     26.2      14.0                            70.0      16.0  Bulb:  ECA:     91.0       5.0  ICA:     56.0      23.0                            70.0      14.0  ICA/CCA:  0.8       1.4    ICA Stenosis:    Left Vertebral:-  Finding: Antegrade  Sys:       52.0  Lizbeth:       15.0    Left Subclavian:    INTERPRETATION/FINDINGS  PROCEDURE:  Color duplex ultrasound imaging of extracranial  cerebrovascular arteries. FINDINGS:       Right:  Internal carotid velocity is within normal limits. There   is narrowing of the internal carotid flow channel on color Doppler  imaging and calcific  plaque on B-mode imaging, consistent with less  than 50 percent stenosis. The common and external carotid arteries  are patent and without evidence of hemodynamically significant  stenosis. Left:  Internal carotid velocity is within normal limits. There  is narrowing of the internal carotid flow channel on color Doppler  imaging and calcific plaque on B-mode imaging, consistent with less  than 50 percent stenosis.   The common and external carotid arteries  are patent and without evidence of hemodynamically significant  stenosis. IMPRESSION:  Consistent with less than 50 percent ( upper end)  stenosis of the right internal carotid and 50 percent stenosis of the  left internal carotid. Vertebrals are patent with antegrade flow. ADDITIONAL COMMENTS    I have personally reviewed the data relevant to the interpretation of  this  study.     TECHNOLOGIST: Angela Mann RVT  Signed: 10/12/2017 08:28 PM    PHYSICIAN: Татьяна Lai MD  Signed: 10/13/2017 08:29 AM

## 2017-10-13 NOTE — CONSULTS
Neuro consult completed. Dictated note to follow. -BPPV - exam with +DHP maneuver to the right, pt reporting dizziness worse with rolling over in bed. Continue meclizine x 1 week max, referral to vestibular therapy for Epley maneuver if persists.    -She does have poorly controlled HTN despite medication compliance, consider PSG for APOORVA with BMI 31, MP class IV posterior OP, +snoring, +wakes self snoring.    -Pt tells me that Dr. Hilda Jimenez is going to start her on a different statin as an outpt for her hypercholesterolemia - .    -Pt is interested in meeting with a nutritionist/dietician as an outpt for education regarding a low cholesterol diet. -Recommend continuing ASA 81mg daily given multiple stroke risk factors.   -Discussed with Dr. Aliza Levy by phone, stable for discharge.

## 2017-10-13 NOTE — PROGRESS NOTES
PCP. Pt doing better this evening. Neuro input appreciated. She is anxious for d/c and d/c home this evening. D/c summary dictated.

## 2017-10-13 NOTE — PROGRESS NOTES
Medical Progress Note      NAME: Chato Aguirre   :  1940  MRM:  [id number]    Date/Time: 10/13/2017           Problem List:     Active Problems:    Unilateral carotid artery disease ([address] (2017)      Overview: 50-79 % stenosis Left ICA . Vertigo (10/12/2017)      Hypertension complicating diabetes ([facility] (10/12/2017)      Type 2 diabetes mellitus with complication (HCC) ()             Subjective:     Brain MRI neg for Mass, CVA  Brain MRA: focal rt MCA stenosis  Carotid doppler : rt <50% stenosis; left 50% stenosis      Hgb 10 with hydration     Balance better by hx and on exam walking today with me   Denies HA , nausea or new c/o's     Past Medical History:   Diagnosis Date    Diabetes ([facility]     Hypertension     Hypertension complicating diabetes ([facility] 10/12/2017    Unilateral [facility] disease ([facility] 2017    50-79 % stenosis Left ICA . Objective:         Vitals:      Last 24hrs VS reviewed since prior progress note. Most recent are:    Visit Vitals    /81 (BP 1 Location: Left arm, BP Patient Position: At rest)    Pulse 72    Temp 98.3 °F (36.8 °C)    Resp 16    Ht 5' (1.524 m)    Wt 160 lb (72.6 kg)    SpO2 99%    BMI 31.25 kg/m2     SpO2 Readings from Last 6 Encounters:   10/13/17 99%   17 98%        No intake or output data in the 24 hours ending 10/13/17 0622               Exam:      General:  Alert, cooperative, no distress, appears stated age. ENT: no nystagmus      Lungs:   Clear to auscultation bilaterally. Heart:  Regular rate and rhythm, S1, S2 normal, no murmur, click, rub or gallop. Abdomen:   Soft, non-tender. Bowel sounds normal.   Extremities: No ankle  Edema. Neuro : ambulates with fleeting minor imbalance which easily self corrects.  O/w non focal      Lab Data Reviewed: (see below)  Recent Results (from the past 24 hour(s))   EKG, 12 LEAD, INITIAL    Collection Time: 10/12/17 12:09 PM   Result Value Ref Range Ventricular Rate 87 BPM    Atrial Rate 87 BPM    P-R Interval 150 ms    QRS Duration 74 ms    Q-T Interval 348 ms    QTC Calculation (Bezet) 418 ms    Calculated P Axis 63 degrees    Calculated R Axis 47 degrees    Calculated T Axis -154 degrees    Diagnosis       Normal sinus rhythm  T wave abnormality, consider inferolateral ischemia  When compared with ECG of 30-MAY-2017 15:28,  No significant change was found  Confirmed by Edwige Unger MD, Yoni Wang ([id number]) on 10/12/2017 9:84:56 PM     METABOLIC PANEL, COMPREHENSIVE    Collection Time: 10/12/17 12:16 PM   Result Value Ref Range    Sodium 140 136 - 145 mmol/L    Potassium 4.1 3.5 - 5.1 mmol/L    Chloride 103 97 - 108 mmol/L    CO2 29 21 - 32 mmol/L    Anion gap 8 5 - 15 mmol/L    Glucose 157 (H) 65 - 100 mg/dL    BUN 14 6 - 20 MG/DL    Creatinine 0.90 0.55 - 1.02 MG/DL    BUN/Creatinine ratio 16 12 - 20      GFR est AA >60 >60 ml/min/1.73m2    GFR est non-AA >60 >60 ml/min/1.73m2    Calcium 9.4 8.5 - 10.1 MG/DL    Bilirubin, total 0.6 0.2 - 1.0 MG/DL    ALT (SGPT) 21 12 - 78 U/L    AST (SGOT) 21 15 - 37 U/L    Alk. phosphatase 73 45 - 117 U/L    Protein, total 8.5 (H) 6.4 - 8.2 g/dL    Albumin 4.1 3.5 - 5.0 g/dL    Globulin 4.4 (H) 2.0 - 4.0 g/dL    A-G Ratio 0.9 (L) 1.1 - 2.2     CBC WITH AUTOMATED DIFF    Collection Time: 10/12/17 12:16 PM   Result Value Ref Range    WBC 4.4 3.6 - 11.0 K/uL    RBC 5.09 3.80 - 5.20 M/uL    HGB 12.2 11.5 - 16.0 g/dL    HCT 40.0 35.0 - 47.0 %    MCV 78.6 (L) 80.0 - 99.0 FL    MCH 24.0 (L) 26.0 - 34.0 PG    MCHC 30.5 30.0 - 36.5 g/dL    RDW 13.0 11.5 - 14.5 %    PLATELET 203 (H) 834 - 400 K/uL    NEUTROPHILS 82 (H) 32 - 75 %    LYMPHOCYTES 14 12 - 49 %    MONOCYTES 3 (L) 5 - 13 %    EOSINOPHILS 1 0 - 7 %    BASOPHILS 0 0 - 1 %    ABS. NEUTROPHILS 3.7 1.8 - 8.0 K/UL    ABS. LYMPHOCYTES 0.6 (L) 0.8 - 3.5 K/UL    ABS. MONOCYTES 0.1 0.0 - 1.0 K/UL    ABS. EOSINOPHILS 0.0 0.0 - 0.4 K/UL    ABS.  BASOPHILS 0.0 0.0 - 0.1 K/UL    DF SMEAR SCANNED PLATELET COMMENTS LARGE PLATELETS      RBC COMMENTS MICROCYTOSIS  PRESENT       TROPONIN I    Collection Time: 10/12/17 12:16 PM   Result Value Ref Range    Troponin-I, Qt. <0.04 <0.05 ng/mL   GLUCOSE, POC    Collection Time: 10/12/17  9:43 PM   Result Value Ref Range    Glucose (POC) 166 (H) 65 - 100 mg/dL    Performed by Beau Huffman    LIPID PANEL    Collection Time: 10/13/17  4:42 AM   Result Value Ref Range    LIPID PROFILE          Cholesterol, total 169 <200 MG/DL    Triglyceride 131 <150 MG/DL    HDL Cholesterol 37 MG/DL    LDL, calculated 105.8 (H) 0 - 100 MG/DL    VLDL, calculated 26.2 MG/DL    CHOL/HDL Ratio 4.6 0 - 5.0     CBC W/O DIFF    Collection Time: 10/13/17  4:42 AM   Result Value Ref Range    WBC 4.3 3.6 - 11.0 K/uL    RBC 4.20 3.80 - 5.20 M/uL    HGB 10.1 (L) 11.5 - 16.0 g/dL    HCT 33.0 (L) 35.0 - 47.0 %    MCV 78.6 (L) 80.0 - 99.0 FL    MCH 24.0 (L) 26.0 - 34.0 PG    MCHC 30.6 30.0 - 36.5 g/dL    RDW 13.3 11.5 - 14.5 %    PLATELET 910 064 - 290 K/uL   METABOLIC PANEL, BASIC    Collection Time: 10/13/17  4:42 AM   Result Value Ref Range    Sodium 143 136 - 145 mmol/L    Potassium 3.7 3.5 - 5.1 mmol/L    Chloride 108 97 - 108 mmol/L    CO2 27 21 - 32 mmol/L    Anion gap 8 5 - 15 mmol/L    Glucose 104 (H) 65 - 100 mg/dL    BUN 16 6 - 20 MG/DL    Creatinine 0.74 0.55 - 1.02 MG/DL    BUN/Creatinine ratio 22 (H) 12 - 20      GFR est AA >60 >60 ml/min/1.73m2    GFR est non-AA >60 >60 ml/min/1.73m2    Calcium 8.2 (L) 8.5 - 10.1 MG/DL   URINALYSIS W/MICROSCOPIC    Collection Time: 10/13/17  4:51 AM   Result Value Ref Range    Color YELLOW/STRAW      Appearance CLOUDY (A) CLEAR      Specific gravity 1.025 1.003 - 1.030      pH (UA) 5.5 5.0 - 8.0      Protein NEGATIVE  NEG mg/dL    Glucose NEGATIVE  NEG mg/dL    Ketone NEGATIVE  NEG mg/dL    Bilirubin NEGATIVE  NEG      Blood NEGATIVE  NEG      Urobilinogen 1.0 0.2 - 1.0 EU/dL    Nitrites NEGATIVE  NEG      Leukocyte Esterase SMALL (A) NEG      WBC 10-20 0 - 4 /hpf    RBC 0-5 0 - 5 /hpf    Epithelial cells MODERATE (A) FEW /lpf    Bacteria NEGATIVE  NEG /hpf    Hyaline cast 2-5 0 - 5 /lpf   URINE CULTURE HOLD SAMPLE    Collection Time: 10/13/17  4:51 AM   Result Value Ref Range    Urine culture hold URINE ON HOLD IN MICROBIOLOGY DEPT FOR 3 DAYS         Medications Reviewed: (see below)    ______________________________________________________________________    Medications:     Current Facility-Administered Medications   Medication Dose Route Frequency    ramipril (ALTACE) capsule 20 mg  20 mg Oral DAILY    sodium chloride (NS) flush 5-10 mL  5-10 mL IntraVENous Q8H    sodium chloride (NS) flush 5-10 mL  5-10 mL IntraVENous PRN    acetaminophen (TYLENOL) tablet 650 mg  650 mg Oral Q4H PRN    insulin lispro (HUMALOG) injection   SubCUTAneous AC&HS    meclizine (ANTIVERT) tablet 25 mg  25 mg Oral Q6H PRN                   Assessment:   Suspect acute Labyrinthitis, improved   Rt MCA stenosis and Carotid artery disease may not be related to Labyrinthitis. Neuro to see   HTN   DM   Lipid d/o   Patient Active Problem List   Diagnosis Code    Unilateral carotid artery disease (HCC) I77.9    Vertigo R42    Hypertension complicating diabetes ([address] E11.59, I10    Type 2 diabetes mellitus with complication ([facility] O18.9          Plan:     HL IVF. Mobilize. Check Vit D level. Discussed adding Pravastatin and ASA .    If doing well , hopefully home later today .                                                        ___________________________________________________      Attending Physician: Guillermo Ritter MD

## 2017-10-13 NOTE — DISCHARGE INSTRUCTIONS
Patient Discharge Instructions    Nikki Alexander / [id number] : 1940    Admitted 10/12/2017 Discharged: 10/13/2017     Take Home Medications          · It is important that you take the medication exactly as they are prescribed. · Keep your medication in the bottles provided by the pharmacist and keep a list of the medication names, dosages, and times to be taken in your wallet. · Do not take other medications without consulting your doctor. What to do at Home    Recommended diet: Diabetic     Recommended activity: gradually increase activity. When you are feeling well without dizziness, you may resume driving . The first time out driving, have another person in the car     If you experience any of the following symptoms : dizziness which does not respond to Meclizine , please follow up with Dr. Carol Martinez at [phone]     Follow-up with Dr. Kathe Chun in 3 to 5 days in his office. Call [phone] to make an appointment. Information obtained by :  I understand that if any problems occur once I am at home I am to contact my physician. I understand and acknowledge receipt of the instructions indicated above.                                                                                                                                            Physician's or R.N.'s Signature                                                                  Date/Time                                                                                                                                              Patient or Representative Signature                                                          Date/Time

## 2017-10-13 NOTE — PROGRESS NOTES
Problem: General Medical Care Plan  Goal: *Vital signs within specified parameters  Outcome: Progressing Towards Goal  Continue every four hour assessment.

## 2017-10-14 NOTE — DISCHARGE SUMMARY
[address], [address] [place] [address]    [facility] SUMMARY       Name:  Kiana Germain   MR#:  [id number]   :  1940   Account #:  [de-identified]        Date of Adm:  10/12/2017       DISCHARGE DIAGNOSES:   1. Benign paroxysmal positional vertigo. 2. Hypertension complicating type 2 diabetes mellitus. 3. Cerebrovascular disease : Moderate stenosis of the M1 segment of the right middle cerebral   artery    4. Mild carotid artery disease. 5. Type 2 diabetes mellitus. 6. Hyperlipidemia. DISCHARGE INSTRUCTIONS:     Diabetic Diet      1. Meclizine 12.5 mg p.o. t.i.d. p.r.n. dizziness. 2. Resume ramipril 20 mg p.o. daily. 3. Start aspirin 81 mg daily. 4. Resume garlic 4071 mg daily. 5. Metformin 500 mg p.o. daily. 6. Gradually increase activity when you are not feeling any dizziness. You may resume driving an automobile, the first time-out have another   adult in the car. 7. Call Dr. Gt Doshi for dizziness not responding to meclizine. 8. Otherwise, followup with him in 3-5 days' time. CHIEF COMPLAINT: A 69year-old diabetic, hypertensive black   female referred to observation for dizziness. HISTORY OF PRESENT ILLNESS: She woke on the morning prior to   admission with vertigo, followed by vomiting and development of a   persistent headache in the ER. Head CT was negative. IV fluids and   meclizine were given, but she had persistent vertigo and was therefore   referred to observation. PAST MEDICAL HISTORY: Hypertension, complicating type 2   diabetes mellitus, type 2 diabetes mellitus, carotid artery disease, 2017   left carotid artery 50% to 79% stenosis, syncopal episode May 2017 of   unclear etiology; however, at that time she was taking her blood   pressure medication incorrectly, sciatica. Vitamin D deficiency     PAST SURGICAL HISTORY: Hysterectomy. MEDICATIONS   1. Ramipril 20 mg daily. 2. Garlic 5397 mg daily.    3. Metformin 500 mg daily.   4. She just completed vitamin D 50,000 units weekly for deficiency. ADVERSE DRUG REACTIONS: Lipitor leg cramps. FAMILY HISTORY: Noncontributory. SOCIAL HISTORY: Never a smoker, negative ETOH. REVIEW OF SYSTEMS: She had one episode of near syncope during   the past 36 hours. Denies chest pain, palpitations, head injury, loss   of consciousness, sudden hearing loss, rectal bleeding or acute focal   neurologic symptoms. PHYSICAL EXAMINATION   VITAL SIGNS: She is not orthostatic after 1 liter of fluid. Her systolic  blood   pressure was 187 lying, and 189 standing, with a pulse remaining in   the 80-81 range. Earlier in the ER stay, temperature 98.3, pulse 86, BP   173/81, RR 18, O2 saturation 96%. HEENT: TMs clear. No nystagmus seen. PERRLA. NECK: No carotid bruits heard. Neck supple, 2+ temporal arteries,   nontender. LUNGS: Clear. HEART: Regular without murmur, gallop, or rub. ABDOMEN: Negative. EXTREMITIES: Feet: No pedal edema, 2+ DP pulses. No pedal   lesions seen. Light touch sensation intact in her feet. NEUROLOGIC: Borderline Romberg. Negative drift. Gait mildly ataxic,   otherwise neurologic nonfocal.     DATA: Glucose 157, BUN 14, creatinine 0.9. WBC 4.4, hemoglobin   12.2, platelet count 088, troponin 1 negative. EKG: NSR, nonspecific T-wave changes. HOSPITAL COURSE: The patient referred to observation on telemetry showing NSR. She was   given IV fluids. Brain MRI and MRA atherosclerosis and showed moderate stenosis in the right MCA   M1 segment. No intracranial mass, hemorrhage or evidence of acute   infarction. His LDL cholesterol   was 106 fasting, vitamin D level was 30. She felt improved and was    ambulating better on 10/13/2017. Leodan Harris MD, neurologist, saw   the patient and diagnosed her with benign paroxysmal positional   vertigo. Her exam showed positive Malinta-Hallpike maneuver to the right,   reporting dizziness worse with rolling over in bed. If her dizziness   persisted, it was recommended that she have meclizine for 1 week   maximum and referred to vestibular therapy for Epley maneuver if   dizziness persists. Hypertension was monitored and she would have   followup on this as an outpatient. Aspirin 81 mg daily was started given   her multiple stroke risk factors and low-dose pravastatin 10 mg every   Monday and Friday was ordered to start as an outpatient. The patient   was discharged home in improved condition.          MD GURPREET [facility] / Valentina Oswald   D:  10/13/2017   17:50   T:  10/13/2017   20:06   Job #:  [id number]

## 2017-10-17 NOTE — CONSULTS
[address] [place] [facility] [place] [address]   [address] [facility]       Name:  Patricio Stern   MR#:  [id number]   :  1940   Account #:  [de-identified]    Date of Consultation:  10/13/2017   Date of Adm:  10/12/2017       HISTORY OF PRESENT ILLNESS: This is a 78-year-old right-handed   female who was admitted yesterday with complaints of vertigo,   headache, fatigue, and nausea. The patient reports that every time she   stands up everything \"goes around and around. \" She also endorses   lightheadedness. Reports that this started on Wednesday. She is   feeling better today, but notes when rolling over in bed, her symptoms   would reoccur. She denies prior history of similar symptoms. Denies   history of stroke. She does have a history of diabetes, hypertension,   and internal carotid artery stenosis. She is not on an antiplatelet agent   at home. Her workup has been completed, including CT of the head   which was unremarkable, EKG with normal sinus rhythm, carotid   Dopplers with bilateral ICA stenosis of 50% in the left ICA and less   than 50% in the right ICA. Vertebrals were noted to be patent with   antegrade flow. Her MRI of the brain with and without contrast did not   show any acute infarct. This is reviewed in PACS. She also had an   MRA of the brain, she was noted to have moderate stenosis in the right   MCA M1 segment, otherwise unremarkable. PAST MEDICAL HISTORY   1. Diabetes. 2. Hypertension, which she reports has been poorly controlled despite compliance with her medication. 3. Hypercholesterolemia. She has tried Lipitor in the past with leg cramping and more recently, Dr. Cherry Larios has told that he is going to start her on a different statin as an outpatient. Her LDL during this admission is 105.   4. Status post hysterectomy. 5. ICA stenosis. 6. Syncope in May of 2017. REVIEW OF SYSTEMS: The patient endorses snoring and wakes   herself snoring.  All other systems are reviewed and are per past   medical history and HPI, otherwise negative. MEDICATIONS AT HOME    1. Ramipril. 2. Garlic. 3. Metformin. Here she has been started on:    1. Aspirin 81 mg a day. 2. Pepcid. 3. Altace. 4. Meclizine. 5. Insulin. ALLERGIES: LIPITOR CAUSED LEG CRAMPS. SOCIAL HISTORY: She lives in [facility] alone. She is retired from   the [employer], for the last 20 years. No tobacco,   alcohol or drug use. FAMILY HISTORY: Mom with stroke and hypertension. Dad with   diabetes. Brother  at 55, states he was in the [place] War   and was ill, does not go into more detail. She does not have any   children. PHYSICAL EXAMINATION   VITAL SIGNS: Blood pressure at presentation was 173/81, currently   169/92, pulse 74, respiratory rate 16, saturating 97% on room air,   temperature is 98.1. GENERAL: She is a well-nourished, well-developed, healthy-  appearing, elderly female in no distress. HEART: Has a regular rate and rhythm without murmurs. Her carotids   are 2+, no bruits. HEENT: MP class IV posterior OP  EXTREMITIES: Warm. No edema. She has 2+ radial pulses. NEUROLOGIC: MENTAL STATUS: She is alert. She is oriented x4. Speech and language are intact. attention, memory and fund of   knowledge are appropriate. CRANIAL NERVE EXAMINATION: She   has no facial asymmetry or ptosis. Extraocular eye movements are   intact without diplopia or nystagmus. Visual fields are full. Pupils   equally round and reactive. Her tongue is midline. Her palate elevated   symmetrically. Trapezius and sternocleidomastoid are 5/5. Her motor   exam is 5/5 throughout. No pronator drift. No tremor. Her sensory   exam is intact to light touch and pinprick throughout. Reflexes are 2+   and symmetric in the upper extremities, 1+ in the lower extremities. Toes downgoing. Coordination intact to finger-to-nose, rapid   alternating movements, heel-to-ballard.  Nani-Hallpike maneuver was positive to the right. STUDIES AND REPORTS: CBC, platelet count 543. Vitamin D. 29.5,   glucose 157. UA with small leukocyte esterase, moderate epithelial   cells, and white cells 10-20. ASSESSMENT AND PLAN: This is a 18-year-old right-handed female   presenting yesterday with 2-day history of vertigo, nausea, complaining   of headache and fatigue, noting that her spinning or dizziness   becomes worse when she rolls over in bed, with an unremarkable   workup thus far. History and exam are consistent with benign   paroxysmal positional vertigo (BPPV). Recommend continuing the   meclizine for one week maximum, and referring her to outpatient   vestibular therapy for Epley maneuver if her symptoms persist.   Additionally, she does have poorly controlled hypertension despite   medication compliance. Consider polysomnogram for obstructive sleep   apnea with a body mass index of 31, a Mallampati class IV posterior   oropharynx, and history of snoring and waking herself snoring. I agree she needs to have her   hypercholesterolemia treated and will defer to her PCP to start a statin   as an outpatient as planned. The patient also expressed interest in   meeting with a nutritionist or dietitian as an outpatient for education   regarding a low-cholesterol diet. Recommend continuing the aspirin 81   mg a day given her multiple stroke risk factors. This was discussed   with Dr. Raydell Ormond by phone at the time of the consultation. Pt is stable for   discharge from a neuro standpoint.         MD Tiara Harman / Wade Brush   D:  10/16/2017   19:59   T:  10/16/2017   21:07   Job #:  [id number]

## 2017-12-07 ENCOUNTER — HOSPITAL ENCOUNTER (OUTPATIENT)
Dept: GENERAL RADIOLOGY | Age: 77
Discharge: HOME | End: 2017-12-07
Attending: INTERNAL MEDICINE
Payer: MEDICARE

## 2017-12-07 DIAGNOSIS — M25.531 RIGHT WRIST PAIN: ICD-10-CM

## 2017-12-07 PROCEDURE — 73110 X-RAY EXAM OF WRIST: CPT

## 2018-05-05 PROBLEM — E11.21 TYPE 2 DIABETES WITH NEPHROPATHY (HCC): Status: ACTIVE | Noted: 2018-05-05

## 2018-07-26 PROBLEM — B02.9 ZOSTER: Status: ACTIVE | Noted: 2018-01-01

## 2020-07-24 PROBLEM — D47.1 MYELOPROLIFERATIVE DISORDER (HCC): Status: ACTIVE | Noted: 2020-07-24

## 2022-03-18 PROBLEM — I67.9 CEREBROVASCULAR DISEASE: Status: ACTIVE | Noted: 2017-10-13

## 2022-03-18 PROBLEM — E11.21 TYPE 2 DIABETES WITH NEPHROPATHY (HCC): Status: ACTIVE | Noted: 2018-05-05

## 2022-03-18 PROBLEM — E11.8 TYPE 2 DIABETES MELLITUS WITH COMPLICATION (HCC): Status: ACTIVE | Noted: 2017-10-12

## 2022-03-18 PROBLEM — I77.9 UNILATERAL CAROTID ARTERY DISEASE (HCC): Status: ACTIVE | Noted: 2017-01-01

## 2022-03-19 PROBLEM — H81.11 BENIGN PAROXYSMAL POSITIONAL VERTIGO OF RIGHT EAR: Status: ACTIVE | Noted: 2017-10-13

## 2022-03-19 PROBLEM — D47.1 MYELOPROLIFERATIVE DISORDER (HCC): Status: ACTIVE | Noted: 2020-07-24

## 2022-03-19 PROBLEM — R42 VERTIGO: Status: ACTIVE | Noted: 2017-10-12

## 2022-03-19 PROBLEM — B02.9 ZOSTER: Status: ACTIVE | Noted: 2018-01-01

## 2022-03-20 PROBLEM — E11.9 HYPERTENSION COMPLICATING DIABETES (HCC): Status: ACTIVE | Noted: 2017-10-12

## 2022-03-20 PROBLEM — I10 HYPERTENSION COMPLICATING DIABETES (HCC): Status: ACTIVE | Noted: 2017-10-12

## 2022-03-20 PROBLEM — E11.59 HYPERTENSION COMPLICATING DIABETES (HCC): Status: ACTIVE | Noted: 2017-10-12

## 2022-03-20 PROBLEM — I15.2 HYPERTENSION COMPLICATING DIABETES (HCC): Status: ACTIVE | Noted: 2017-10-12

## 2022-08-08 PROBLEM — N18.30 CHRONIC RENAL DISEASE, STAGE III (HCC): Status: ACTIVE | Noted: 2022-08-08

## 2023-09-26 ENCOUNTER — HOSPITAL ENCOUNTER (OUTPATIENT)
Facility: HOSPITAL | Age: 83
Discharge: HOME | End: 2023-09-29
Payer: MEDICARE

## 2023-09-26 DIAGNOSIS — R06.02 SOB (SHORTNESS OF BREATH): ICD-10-CM

## 2023-09-26 DIAGNOSIS — R79.89 ABNORMAL THYROID BLOOD TEST: ICD-10-CM

## 2023-09-26 DIAGNOSIS — Z79.899 ENCOUNTER FOR LONG-TERM (CURRENT) USE OF MEDICATIONS: ICD-10-CM

## 2023-09-26 PROCEDURE — 71046 X-RAY EXAM CHEST 2 VIEWS: CPT

## 2024-02-20 ENCOUNTER — HOSPITAL ENCOUNTER (OUTPATIENT)
Facility: HOSPITAL | Age: 84
Discharge: HOME | End: 2024-02-23
Payer: MEDICARE

## 2024-02-20 DIAGNOSIS — I15.2 HYPERTENSION COMPLICATING DIABETES (HCC): ICD-10-CM

## 2024-02-20 DIAGNOSIS — R06.02 SOB (SHORTNESS OF BREATH): ICD-10-CM

## 2024-02-20 DIAGNOSIS — Z79.899 ENCOUNTER FOR LONG-TERM (CURRENT) USE OF MEDICATIONS: ICD-10-CM

## 2024-02-20 DIAGNOSIS — E11.59 HYPERTENSION COMPLICATING DIABETES (HCC): ICD-10-CM

## 2024-02-20 PROCEDURE — 71046 X-RAY EXAM CHEST 2 VIEWS: CPT

## 2024-02-27 ENCOUNTER — HOSPITAL ENCOUNTER (OUTPATIENT)
Facility: HOSPITAL | Age: 84
Discharge: HOME | End: 2024-03-01
Payer: MEDICARE

## 2024-02-27 DIAGNOSIS — Z79.899 ENCOUNTER FOR LONG-TERM (CURRENT) USE OF MEDICATIONS: ICD-10-CM

## 2024-02-27 DIAGNOSIS — R06.02 SOB (SHORTNESS OF BREATH): ICD-10-CM

## 2024-02-27 DIAGNOSIS — D47.1 CHRONIC MYELOPROLIFERATIVE DISEASE (HCC): ICD-10-CM

## 2024-02-27 PROCEDURE — 71046 X-RAY EXAM CHEST 2 VIEWS: CPT

## 2024-04-01 LAB
LEFT VENTRICULAR EJECTION FRACTION MODE: NORMAL
LV EF: 55 %

## 2024-04-22 LAB
LEFT VENTRICULAR EJECTION FRACTION HIGH VALUE: 65 %
LEFT VENTRICULAR EJECTION FRACTION MODE: NORMAL
LV EF: 60 %

## 2024-06-12 LAB
LEFT VENTRICULAR EJECTION FRACTION MODE: NORMAL
LV EF: 49 %

## 2024-06-24 ENCOUNTER — TELEPHONE (OUTPATIENT)
Age: 84
End: 2024-06-24

## 2024-06-24 NOTE — TELEPHONE ENCOUNTER
Attempted to contact patient.  Left a message for New Patient referred by Dr. Rivera for F  Patient was provided the clinic phone number to follow up for scheduling, in addition to my extension.     Lisy Newberry, CMA

## 2024-06-25 ENCOUNTER — TELEPHONE (OUTPATIENT)
Age: 84
End: 2024-06-25

## 2024-06-25 NOTE — TELEPHONE ENCOUNTER
Pt was returning your call in regards to scheduling new patient appointment. Pt says to call home phone [phone]

## 2024-06-27 NOTE — TELEPHONE ENCOUNTER
Returned patients phone call .. Provided her with dates for new patient appointment. Pt states that's she will call her niece and get back to me to schedule appointment

## 2024-07-02 NOTE — PROGRESS NOTES
Sign     Days of Exercise per Week: 2 days     Minutes of Exercise per Session: 10 min   Stress: Not on file   Social Connections: Not on file   Intimate Partner Violence: Not on file   Housing Stability: Not on file       LABORATORY RESULTS:  Lab Results   Component Value Date/Time     02/20/2024 12:23 PM    K 4.8 02/20/2024 12:23 PM     02/20/2024 12:23 PM    CO2 21 02/20/2024 12:23 PM    BUN 14 02/20/2024 12:23 PM    GFRAA 61 08/06/2021 10:46 AM    ALT 17 02/20/2024 12:23 PM    AST 22 02/20/2024 12:23 PM       Lab Results   Component Value Date/Time    WBC 4.9 02/27/2024 11:55 AM    HGBPOC 10.8 12/18/2019 11:35 AM    HGB 10.2 02/27/2024 11:55 AM    HCTPOC 33.6 12/18/2019 11:35 AM    HCT 32.9 02/27/2024 11:55 AM     02/27/2024 11:55 AM    MCV 77 02/27/2024 11:55 AM    MCV 75.8 12/18/2019 11:35 AM       ALLERGY:  Allergies   Allergen Reactions    Atorvastatin Other (See Comments)     Leg cramps     Pravastatin Myalgia       CURRENT MEDICATIONS:    Current Outpatient Medications:     amLODIPine (NORVASC) 10 MG tablet, Take 1 tablet by mouth daily, Disp: 90 tablet, Rfl: 1    furosemide (LASIX) 20 MG tablet, Take 1 tablet by mouth as needed (weight gain>3lbs/day or 5lbs/week) In place of Hydrochlorothiazide, Disp: 90 tablet, Rfl: 1    empagliflozin (JARDIANCE) 10 MG tablet, Take 1 tablet by mouth daily, Disp: 90 tablet, Rfl: 1    loratadine (CLARITIN) 10 MG tablet, Take 1 tablet by mouth daily, Disp: 30 tablet, Rfl: 11    rosuvastatin (CRESTOR) 5 MG tablet, TAKE ONE TABLET BY MOUTH EACH MONDAY AND FRIDAY, Disp: 24 tablet, Rfl: 3    meclizine (ANTIVERT) 12.5 MG tablet, Take 1 tablet by mouth 3 times daily as needed for Dizziness, Disp: 30 tablet, Rfl: 3    Multiple Vitamins-Minerals (CENTRUM SILVER PO), Take by mouth One pill daily, Disp: , Rfl:     metoprolol succinate (TOPROL XL) 25 MG extended release tablet, TAKE 1 TABLET BY MOUTH EVERY DAY, Disp: 90 tablet, Rfl: 3    metFORMIN (GLUCOPHAGE) 500

## 2024-07-03 ENCOUNTER — OFFICE VISIT (OUTPATIENT)
Age: 84
End: 2024-07-03
Payer: MEDICARE

## 2024-07-03 VITALS
SYSTOLIC BLOOD PRESSURE: 140 MMHG | DIASTOLIC BLOOD PRESSURE: 70 MMHG | WEIGHT: 142.8 LBS | BODY MASS INDEX: 28.79 KG/M2 | RESPIRATION RATE: 16 BRPM | TEMPERATURE: 98.3 F | HEIGHT: 59 IN | HEART RATE: 93 BPM | OXYGEN SATURATION: 96 %

## 2024-07-03 DIAGNOSIS — I50.32 CHRONIC DIASTOLIC HEART FAILURE (HCC): Primary | ICD-10-CM

## 2024-07-03 DIAGNOSIS — E11.59 HYPERTENSION COMPLICATING DIABETES (HCC): ICD-10-CM

## 2024-07-03 DIAGNOSIS — I50.32 CHRONIC DIASTOLIC HEART FAILURE (HCC): ICD-10-CM

## 2024-07-03 DIAGNOSIS — I15.2 HYPERTENSION COMPLICATING DIABETES (HCC): ICD-10-CM

## 2024-07-03 LAB
DISTANCE WALKED: NORMAL
SPO2: NORMAL

## 2024-07-03 PROCEDURE — 3077F SYST BP >= 140 MM HG: CPT | Performed by: INTERNAL MEDICINE

## 2024-07-03 PROCEDURE — 94618 PULMONARY STRESS TESTING: CPT | Performed by: INTERNAL MEDICINE

## 2024-07-03 PROCEDURE — 93000 ELECTROCARDIOGRAM COMPLETE: CPT | Performed by: INTERNAL MEDICINE

## 2024-07-03 PROCEDURE — 3078F DIAST BP <80 MM HG: CPT | Performed by: INTERNAL MEDICINE

## 2024-07-03 PROCEDURE — 99203 OFFICE O/P NEW LOW 30 MIN: CPT | Performed by: INTERNAL MEDICINE

## 2024-07-03 RX ORDER — VITAMIN B COMPLEX
100 TABLET ORAL DAILY
COMMUNITY

## 2024-07-03 RX ORDER — DAPAGLIFLOZIN 10 MG/1
10 TABLET, FILM COATED ORAL EVERY MORNING
Qty: 90 TABLET | Refills: 1 | Status: SHIPPED | OUTPATIENT
Start: 2024-07-03

## 2024-07-03 RX ORDER — AMLODIPINE BESYLATE 10 MG/1
10 TABLET ORAL DAILY
Qty: 90 TABLET | Refills: 1 | Status: SHIPPED | OUTPATIENT
Start: 2024-07-03

## 2024-07-03 RX ORDER — FUROSEMIDE 20 MG/1
20 TABLET ORAL PRN
Qty: 90 TABLET | Refills: 1 | Status: SHIPPED | OUTPATIENT
Start: 2024-07-03

## 2024-07-03 ASSESSMENT — PATIENT HEALTH QUESTIONNAIRE - PHQ9
SUM OF ALL RESPONSES TO PHQ QUESTIONS 1-9: 0
1. LITTLE INTEREST OR PLEASURE IN DOING THINGS: NOT AT ALL
SUM OF ALL RESPONSES TO PHQ9 QUESTIONS 1 & 2: 0
SUM OF ALL RESPONSES TO PHQ QUESTIONS 1-9: 0
SUM OF ALL RESPONSES TO PHQ QUESTIONS 1-9: 0
2. FEELING DOWN, DEPRESSED OR HOPELESS: NOT AT ALL
SUM OF ALL RESPONSES TO PHQ QUESTIONS 1-9: 0

## 2024-07-03 NOTE — PATIENT INSTRUCTIONS
Medication changes:     Increase amlodipine to 10mg daily     START farxiga 10mg daily     Decrease lasix to 20mg daily as needed for weight gain of 3 lbs or more overnight or 5 lbs or more in on week.     You have been prescribed Jardiance/Farxiga as part of your heart failure regimen.  Please see the information provided to explain how this medication works for your heart failure.  It is very important to let our office know if you have any signs or symptoms of a urinary tract infection or yeast infection such as a burning feeling when passing urine, a need to urinate more often, the need to urinate right away, pain in the lower part of your stomach, or blood in the urine.  Sometimes people also may have a fever, back pain, nausea or vomiting.  Please call our office at [phone] for any of these symptoms.      Please record blood pressure daily before medication and two hours after medication, please record weight daily upon waking/after using the bathroom. Keep a written records of your weights and blood pressure and bring to your next appointment. If you have a weight gain of 3 or more pounds overnight OR 5 or more pounds in one week, new/worsened shortness of breath or swelling or if your blood pressure begins to consistently run below 90/60 and/or you begin to experience dizziness or lightheadedness please contact our office at [phone] option 2.        Testing Ordered:     PYP testing has been scheduled for Tuesday July 23rd at 10am. Please arrive by 9:45am  to check in. This test is located at the [facility] at [address] [place] [address], [address], [place] [address]. Please be aware that the entire process takes approximately 4 hours, as you will be injected with a radioisotope and then asked to wait for 3 hours after which you will be imaged for the scan.  You can leave the facility and return in 2.5 hours to be imaged at the 3 hour katarzyna.  If this appointment does not work

## 2024-07-04 LAB
25(OH)D3 SERPL-MCNC: 49.2 NG/ML (ref 30–100)
ALBUMIN SERPL-MCNC: 3.9 G/DL (ref 3.5–5)
ALBUMIN/GLOB SERPL: 1 (ref 1.1–2.2)
ALP SERPL-CCNC: 76 U/L (ref 45–117)
ALT SERPL-CCNC: 11 U/L (ref 12–78)
ANION GAP SERPL CALC-SCNC: 7 MMOL/L (ref 5–15)
AST SERPL-CCNC: 13 U/L (ref 15–37)
BILIRUB SERPL-MCNC: 0.3 MG/DL (ref 0.2–1)
BUN SERPL-MCNC: 22 MG/DL (ref 6–20)
BUN/CREAT SERPL: 17 (ref 12–20)
CALCIUM SERPL-MCNC: 9.3 MG/DL (ref 8.5–10.1)
CHLORIDE SERPL-SCNC: 107 MMOL/L (ref 97–108)
CHOLEST SERPL-MCNC: 120 MG/DL
CO2 SERPL-SCNC: 26 MMOL/L (ref 21–32)
CREAT SERPL-MCNC: 1.29 MG/DL (ref 0.55–1.02)
GLOBULIN SER CALC-MCNC: 4.1 G/DL (ref 2–4)
GLUCOSE SERPL-MCNC: 101 MG/DL (ref 65–100)
HDLC SERPL-MCNC: 33 MG/DL
HDLC SERPL: 3.6 (ref 0–5)
LDLC SERPL CALC-MCNC: 57.4 MG/DL (ref 0–100)
NT PRO BNP: 896 PG/ML
POTASSIUM SERPL-SCNC: 4.6 MMOL/L (ref 3.5–5.1)
PROT SERPL-MCNC: 8 G/DL (ref 6.4–8.2)
SODIUM SERPL-SCNC: 140 MMOL/L (ref 136–145)
TRIGL SERPL-MCNC: 148 MG/DL
VLDLC SERPL CALC-MCNC: 29.6 MG/DL

## 2024-07-10 PROBLEM — I50.30 (HFPEF) HEART FAILURE WITH PRESERVED EJECTION FRACTION (HCC): Status: ACTIVE | Noted: 2024-07-10

## 2024-07-10 LAB
ALBUMIN SERPL ELPH-MCNC: 3.8 G/DL (ref 2.9–4.4)
ALBUMIN/GLOB SERPL: 1.2 (ref 0.7–1.7)
ALPHA1 GLOB SERPL ELPH-MCNC: 0.2 G/DL (ref 0–0.4)
ALPHA2 GLOB SERPL ELPH-MCNC: 0.7 G/DL (ref 0.4–1)
B-GLOBULIN SERPL ELPH-MCNC: 0.8 G/DL (ref 0.7–1.3)
GAMMA GLOB SERPL ELPH-MCNC: 1.5 G/DL (ref 0.4–1.8)
GLOBULIN SER-MCNC: 3.2 G/DL (ref 2.2–3.9)
IGA SERPL-MCNC: 177 MG/DL (ref 64–422)
IGG SERPL-MCNC: 1509 MG/DL (ref 586–1602)
IGM SERPL-MCNC: 244 MG/DL (ref 26–217)
INTERPRETATION SERPL IEP-IMP: ABNORMAL
KAPPA LC FREE SER-MCNC: 49.5 MG/L (ref 3.3–19.4)
KAPPA LC FREE/LAMBDA FREE SER: 1.45 (ref 0.26–1.65)
LAMBDA LC FREE SERPL-MCNC: 34.1 MG/L (ref 5.7–26.3)
M PROTEIN SERPL ELPH-MCNC: ABNORMAL G/DL
PROT SERPL-MCNC: 7 G/DL (ref 6–8.5)

## 2024-07-11 ENCOUNTER — TELEPHONE (OUTPATIENT)
Age: 84
End: 2024-07-11

## 2024-07-11 NOTE — TELEPHONE ENCOUNTER
Per Dr. Huertas, called pt to inform her of appt time change from 9:00 to 9:30 a.m. on 8/7/24.  Pt okayed change.

## 2024-08-01 ENCOUNTER — TELEPHONE (OUTPATIENT)
Age: 84
End: 2024-08-01

## 2024-08-01 ENCOUNTER — ANCILLARY PROCEDURE (OUTPATIENT)
Age: 84
End: 2024-08-01
Payer: MEDICARE

## 2024-08-01 VITALS
WEIGHT: 141 LBS | HEIGHT: 59 IN | SYSTOLIC BLOOD PRESSURE: 138 MMHG | BODY MASS INDEX: 28.43 KG/M2 | DIASTOLIC BLOOD PRESSURE: 52 MMHG

## 2024-08-01 DIAGNOSIS — I50.32 CHRONIC DIASTOLIC HEART FAILURE (HCC): ICD-10-CM

## 2024-08-01 DIAGNOSIS — I15.2 HYPERTENSION COMPLICATING DIABETES (HCC): ICD-10-CM

## 2024-08-01 DIAGNOSIS — E11.59 HYPERTENSION COMPLICATING DIABETES (HCC): ICD-10-CM

## 2024-08-01 PROCEDURE — 78803 RP LOCLZJ TUM SPECT 1 AREA: CPT | Performed by: INTERNAL MEDICINE

## 2024-08-01 PROCEDURE — A9538 TC99M PYROPHOSPHATE: HCPCS | Performed by: INTERNAL MEDICINE

## 2024-08-01 RX ADMIN — Medication 16.5 MILLICURIE: at 09:35

## 2024-08-02 LAB
ECHO BSA: 1.63 M2
NUC 3 HOUR HEART TO CONTRALATERAL RATIO: 1.6

## 2024-08-03 NOTE — PROGRESS NOTES
Social Determinants of Health     Financial Resource Strain: Not on file   Food Insecurity: Not on file   Transportation Needs: Not on file   Physical Activity: Insufficiently Active (7/9/2024)    Exercise Vital Sign     Days of Exercise per Week: 3 days     Minutes of Exercise per Session: 30 min   Stress: Not on file   Social Connections: Not on file   Intimate Partner Violence: Not on file   Housing Stability: Not on file       LABORATORY RESULTS:  Lab Results   Component Value Date/Time     07/09/2024 10:34 AM    K 5.3 07/09/2024 10:34 AM     07/09/2024 10:34 AM    CO2 24 07/09/2024 10:34 AM    BUN 29 07/09/2024 10:34 AM    GFRAA 61 08/06/2021 10:46 AM    GLOB 4.1 07/03/2024 10:06 AM    GLOB 3.2 07/03/2024 10:06 AM    ALT 11 07/03/2024 10:06 AM    AST 13 07/03/2024 10:06 AM       Lab Results   Component Value Date/Time    WBC 5.3 07/09/2024 10:34 AM    HGBPOC 10.8 12/18/2019 11:35 AM    HGB 10.6 07/09/2024 10:34 AM    HCTPOC 33.6 12/18/2019 11:35 AM    HCT 34.6 07/09/2024 10:34 AM     07/09/2024 10:34 AM    MCV 76 07/09/2024 10:34 AM    MCV 75.8 12/18/2019 11:35 AM       ALLERGY:  Allergies   Allergen Reactions    Atorvastatin Other (See Comments)     Leg cramps     Pravastatin Myalgia       CURRENT MEDICATIONS:    Current Outpatient Medications:     Tafamidis (VYNDAMAX) 61 MG CAPS, Take 61 mg by mouth daily, Disp: 30 capsule, Rfl: 3    amLODIPine (NORVASC) 10 MG tablet, Take 1 tablet by mouth daily, Disp: 90 tablet, Rfl: 1    furosemide (LASIX) 20 MG tablet, Take 1 tablet by mouth as needed (weight gain>3lbs/day or 5lbs/week) In place of Hydrochlorothiazide, Disp: 90 tablet, Rfl: 1    dapagliflozin (FARXIGA) 10 MG tablet, Take 1 tablet by mouth every morning, Disp: 90 tablet, Rfl: 1    Coenzyme Q10 (COQ10) 100 MG CAPS, Take 100 mg by mouth daily, Disp: , Rfl:     loratadine (CLARITIN) 10 MG tablet, Take 1 tablet by mouth daily, Disp: 30 tablet, Rfl: 11    rosuvastatin (CRESTOR) 5 MG

## 2024-08-05 ENCOUNTER — TELEPHONE (OUTPATIENT)
Age: 84
End: 2024-08-05

## 2024-08-05 RX ORDER — TAFAMIDIS 61 MG/1
61 CAPSULE, LIQUID FILLED ORAL DAILY
Qty: 30 CAPSULE | Refills: 3 | Status: SHIPPED | OUTPATIENT
Start: 2024-08-05

## 2024-08-05 NOTE — TELEPHONE ENCOUNTER
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 We had already discussed it was likely to be positive when I discussed with her results of the positive genetic testing. She has an upcoming visit. Can we start application for Vyndamax    Called patient regarding above results, Patient verbalized understanding. Patient has no further questions.      Vynadmax sent to [facility] specialty pharmacy to start process to assess insurance coverage/out of pocket cost for patient.    Attempted PA on cover my meds  Message from Plan  Your PA request cannot be processed for the member plan submitted. For further inquiries please contact the number on the back of the member prescription card. (Message [phone])    Telephone call for PA through [employer] [phone], 72 hour determination time frame  Approval number [id number]    Fax received with approval for Vyndamax.    Called [facility] specialty, they recommend calling back tomorrow as they said the PA is not updated in their system yet.   8/7- called [facility] specialty again, this rep states it may take 1 week. RN will follow up next week on cost.    Patient assistance application completed at office visit 8/7, in case it is needed

## 2024-08-07 ENCOUNTER — OFFICE VISIT (OUTPATIENT)
Age: 84
End: 2024-08-07
Payer: MEDICARE

## 2024-08-07 VITALS
OXYGEN SATURATION: 96 % | SYSTOLIC BLOOD PRESSURE: 124 MMHG | WEIGHT: 139 LBS | DIASTOLIC BLOOD PRESSURE: 62 MMHG | TEMPERATURE: 97.8 F | RESPIRATION RATE: 18 BRPM | BODY MASS INDEX: 28.02 KG/M2 | HEIGHT: 59 IN | HEART RATE: 91 BPM

## 2024-08-07 DIAGNOSIS — I50.32 CHRONIC DIASTOLIC HEART FAILURE (HCC): ICD-10-CM

## 2024-08-07 DIAGNOSIS — E85.89 OTHER AMYLOIDOSIS (HCC): ICD-10-CM

## 2024-08-07 DIAGNOSIS — I43 FAMILIAL TRANSTHYRETIN AMYLOID CARDIOMYOPATHY (HCC): ICD-10-CM

## 2024-08-07 DIAGNOSIS — E85.4 FAMILIAL TRANSTHYRETIN AMYLOID CARDIOMYOPATHY (HCC): ICD-10-CM

## 2024-08-07 DIAGNOSIS — I50.32 CHRONIC DIASTOLIC HEART FAILURE (HCC): Primary | ICD-10-CM

## 2024-08-07 DIAGNOSIS — I48.91 ATRIAL FIBRILLATION, UNSPECIFIED TYPE (HCC): ICD-10-CM

## 2024-08-07 LAB
ANION GAP SERPL CALC-SCNC: 6 MMOL/L (ref 5–15)
BUN SERPL-MCNC: 30 MG/DL (ref 6–20)
BUN/CREAT SERPL: 20 (ref 12–20)
CALCIUM SERPL-MCNC: 10.5 MG/DL (ref 8.5–10.1)
CHLORIDE SERPL-SCNC: 107 MMOL/L (ref 97–108)
CO2 SERPL-SCNC: 25 MMOL/L (ref 21–32)
CREAT SERPL-MCNC: 1.49 MG/DL (ref 0.55–1.02)
GLUCOSE SERPL-MCNC: 174 MG/DL (ref 65–100)
NT PRO BNP: 550 PG/ML
POTASSIUM SERPL-SCNC: 4.3 MMOL/L (ref 3.5–5.1)
SODIUM SERPL-SCNC: 138 MMOL/L (ref 136–145)

## 2024-08-07 PROCEDURE — 3074F SYST BP LT 130 MM HG: CPT | Performed by: INTERNAL MEDICINE

## 2024-08-07 PROCEDURE — 3078F DIAST BP <80 MM HG: CPT | Performed by: INTERNAL MEDICINE

## 2024-08-07 PROCEDURE — 99214 OFFICE O/P EST MOD 30 MIN: CPT | Performed by: INTERNAL MEDICINE

## 2024-08-07 PROCEDURE — 1123F ACP DISCUSS/DSCN MKR DOCD: CPT | Performed by: INTERNAL MEDICINE

## 2024-08-07 ASSESSMENT — PATIENT HEALTH QUESTIONNAIRE - PHQ9
2. FEELING DOWN, DEPRESSED OR HOPELESS: NOT AT ALL
SUM OF ALL RESPONSES TO PHQ QUESTIONS 1-9: 0
SUM OF ALL RESPONSES TO PHQ9 QUESTIONS 1 & 2: 0
1. LITTLE INTEREST OR PLEASURE IN DOING THINGS: NOT AT ALL
SUM OF ALL RESPONSES TO PHQ QUESTIONS 1-9: 0

## 2024-08-07 NOTE — PATIENT INSTRUCTIONS
Medication Changes:    NONE    Please take this to your pharmacy to notify them of the change in medications.       Testing Ordered:    Labs - clinic performed    Referrals:  [facility] [phone]  - Dr. Suzette Nguyen  - for genetic and amyloidosis counseling    Other Recommendations:      - Record blood pressure and heart rate daily before medication and two hours after medication  - Record weight daily upon waking/after using the bathroom.   - Keep a written records of your weights and blood pressure and bring to your next appointment.   - Call the clinic at if you have a weight gain of 3 or more pounds overnight OR 5 or more pounds in one week, new/worsened shortness of breath or swelling, or if your blood pressure begins to consistently run below 90/60 and/or you begin to experience dizziness or lightheadedness. Our office phone number [phone] option 2.  - Ensure you are drinking an adequate amount of water with a goal of 6-8 eight ounce glasses (1.5-2 liters) of fluid daily. Your urine should be clear and light yellow straw colored.         Thank you for allowing us the privilege of being a part of your healthcare team! Please do not hesitate to contact our office at [phone] option 2 with any questions or concerns.     Please make sure to have an active My Chart account. Having issues logging in? Contact the [facility] Help Desk at [phone].   - [place] [facility] is the best way to communicate with [facility]. We can answer your questions, you can report your weight and BP logs and we notify you of any abnormal results requiring changes to your current plan of care.     We are restarting a monthly heart failure support group, this will be the last Wednesday of every month from 5-6pm at [facility]. If you would like to attend you will need to RSVP to [email or web address]

## 2024-08-08 ENCOUNTER — TELEPHONE (OUTPATIENT)
Age: 84
End: 2024-08-08

## 2024-08-08 NOTE — TELEPHONE ENCOUNTER
----- Message from Elissa Huertas MD sent at 8/8/2024  8:40 AM EDT -----  Please let patient know after starting Jardiance, renal function is stable and proBNP has trended down. No other changes    Called patient regarding above results, Patient verbalized understanding. Patient has no further questions.

## 2024-08-21 ENCOUNTER — TELEPHONE (OUTPATIENT)
Age: 84
End: 2024-08-21

## 2024-08-21 NOTE — TELEPHONE ENCOUNTER
Spoke to patients yanethece Leeann regarding Vyndamax.    [facility] specialty pharmacy had called them to ship drug, they were informed of copay of 85.87 for 30 days, this is unaffordable for her.    Transmit is currently open for amyloidsos. RN assisted helping Leeann complete application online, she will finish it later with patient. RN asked her to keep us up to date regarding foundation approval.   Maximum awarded funds of $8,000 would cover co pay for the year.    Vyndamax patient assistance application on hold due to open foundation, will follow up with Leeann by next week to see if funds awarded, if not then we can proceed with patient assistance application.

## 2024-09-03 ENCOUNTER — TELEPHONE (OUTPATIENT)
Age: 84
End: 2024-09-03

## 2024-09-03 NOTE — TELEPHONE ENCOUNTER
----- Message from VAIBHAV KAUFMAN RN sent at 8/15/2024 11:42 AM EDT -----  Regarding: FW: Vyndamax and rosuvastatin    ----- Message -----  From: Page Short RN  Sent: 8/15/2024  11:40 AM EDT  To: Page Short RN  Subject: FW: Vyndamax and rosuvastatin                    Yaquelin Guzmán to stop (discontinue) rosuvastatin when/if Vendamax starts  ----- Message -----  From: Elissa Huertas MD  Sent: 8/12/2024  12:58 PM EDT  To: Page Short RN  Subject: RE: Vyndamax and rosuvastatin                    Ok then just have her discontinue Rosuvastatin  ----- Message -----  From: Page Short RN  Sent: 8/12/2024  12:25 PM EDT  To: Elissa Huertas MD  Subject: RE: Vyndamax and rosuvastatin                    Womp!... Womp!...    She's allergic to atorvastatin.  ----- Message -----  From: Elissa Huertas MD  Sent: 8/12/2024  11:32 AM EDT  To: Page Short RN  Subject: RE: Vyndamax and rosuvastatin                    Thanks for the clarification. She has had carotid disease. Can you please clarify the dosing of Rosuvastatin we have is correct-5 mg two times weekly. If so, just have her transition to Atorvastatin 5 mg two times weekly  ----- Message -----  From: Page Short RN  Sent: 8/12/2024  11:26 AM EDT  To: Elissa Huertas MD  Subject: Vyndamax and rosuvastatin                        Per the pharmacist, Vyndamax amplifies the effects of statins except atorvastatin

## 2024-09-03 NOTE — TELEPHONE ENCOUNTER
Spoke with Yaquelin Guzmán to inform her, per Dr. Huertas, to discontinue rosuvastatin as it is contraindicated with Vyndamax. Ms. Guzmán verbalized understanding.

## 2024-11-11 ENCOUNTER — TELEPHONE (OUTPATIENT)
Age: 84
End: 2024-11-11

## 2024-11-11 NOTE — TELEPHONE ENCOUNTER
Telephone Call RE:  Appointment reminder     Outcome:     [x] Patient confirmed appointment   [] Patient rescheduled appointment for    [] Unable to reach  [] Left message              [x] Other: parking

## 2024-11-13 ENCOUNTER — OFFICE VISIT (OUTPATIENT)
Age: 84
End: 2024-11-13
Payer: MEDICARE

## 2024-11-13 VITALS
WEIGHT: 137.4 LBS | OXYGEN SATURATION: 98 % | BODY MASS INDEX: 27.7 KG/M2 | RESPIRATION RATE: 18 BRPM | DIASTOLIC BLOOD PRESSURE: 66 MMHG | HEART RATE: 93 BPM | TEMPERATURE: 97.2 F | SYSTOLIC BLOOD PRESSURE: 138 MMHG | HEIGHT: 59 IN

## 2024-11-13 DIAGNOSIS — E85.4 HEREDITARY CARDIAC AMYLOIDOSIS (HCC): Primary | ICD-10-CM

## 2024-11-13 DIAGNOSIS — I50.32 CHRONIC DIASTOLIC HF (HEART FAILURE) (HCC): ICD-10-CM

## 2024-11-13 DIAGNOSIS — I10 PRIMARY HYPERTENSION: ICD-10-CM

## 2024-11-13 DIAGNOSIS — E61.1 IRON DEFICIENCY: ICD-10-CM

## 2024-11-13 DIAGNOSIS — I43 HEREDITARY CARDIAC AMYLOIDOSIS (HCC): Primary | ICD-10-CM

## 2024-11-13 PROCEDURE — 1123F ACP DISCUSS/DSCN MKR DOCD: CPT | Performed by: NURSE PRACTITIONER

## 2024-11-13 PROCEDURE — 99214 OFFICE O/P EST MOD 30 MIN: CPT | Performed by: NURSE PRACTITIONER

## 2024-11-13 PROCEDURE — 3075F SYST BP GE 130 - 139MM HG: CPT | Performed by: NURSE PRACTITIONER

## 2024-11-13 PROCEDURE — 3078F DIAST BP <80 MM HG: CPT | Performed by: NURSE PRACTITIONER

## 2024-11-13 RX ORDER — DAPAGLIFLOZIN 10 MG/1
10 TABLET, FILM COATED ORAL EVERY MORNING
Qty: 90 TABLET | Refills: 1 | Status: SHIPPED | OUTPATIENT
Start: 2024-11-13

## 2024-11-13 RX ORDER — AMLODIPINE BESYLATE 10 MG/1
10 TABLET ORAL DAILY
Qty: 90 TABLET | Refills: 1 | Status: SHIPPED | OUTPATIENT
Start: 2024-11-13

## 2024-11-13 RX ORDER — FUROSEMIDE 20 MG/1
20 TABLET ORAL PRN
Qty: 90 TABLET | Refills: 1 | Status: SHIPPED | OUTPATIENT
Start: 2024-11-13

## 2024-11-13 ASSESSMENT — PATIENT HEALTH QUESTIONNAIRE - PHQ9
SUM OF ALL RESPONSES TO PHQ QUESTIONS 1-9: 0
1. LITTLE INTEREST OR PLEASURE IN DOING THINGS: NOT AT ALL
2. FEELING DOWN, DEPRESSED OR HOPELESS: NOT AT ALL
SUM OF ALL RESPONSES TO PHQ QUESTIONS 1-9: 0
SUM OF ALL RESPONSES TO PHQ9 QUESTIONS 1 & 2: 0

## 2024-11-13 NOTE — PATIENT INSTRUCTIONS
Medication Changes:    NONE      Testing Ordered:    NONE    Referrals:      Other Recommendations:      - Record blood pressure and heart rate 2 times daily: before medication and two hours after medication  - Record weight daily upon waking/after using the bathroom.   - Keep a written records of your weights and blood pressure and bring to your next appointment.   - Call the clinic at if you have a weight gain of 3 or more pounds overnight OR 5 or more pounds in one week, new/worsened shortness of breath or swelling, or if your blood pressure begins to consistently run below 90/60 and/or you begin to experience dizziness or lightheadedness. Our office phone number [phone] option 2.  - Drink an adequate amount of water with a goal of 6-8 eight ounce glasses (1.5-2 liters) of fluid daily. Your urine should be clear and light yellow straw colored.       Follow up     3 MONTH FOLLOW UP with [facility] [place]      Thank you for allowing us the privilege of being a part of your healthcare team!  Please do not hesitate to contact our office at [phone] option 2 with any questions or concerns.     Please make sure to have an active My Chart account. Having issues logging in? Contact the [facility] Help Desk at [phone].   - [employer] is the best way to communicate with [facility]. We can answer your questions, you can report your weight and BP logs and we notify you of any abnormal results requiring changes to your current plan of care.     The monthly Heart Failure Support Group meets the last Wednesday of every month from 5-6pm at [facility]. If you would like to attend, please RSVP to [email or web address]

## 2024-11-13 NOTE — PROGRESS NOTES
ADVANCED HEART FAILURE CENTER  [facility] in [place], [place]  Heart Failure Outpatient Clinic Note    Patient name: Yaquelin Guzmán  Patient : 1940  Patient MRN: [id number]  Date of service: 24    Primary care physician: FRANKIE Mendez IV, MD  Primary cardiologist: Tl Rivera MD  Primary [facility] cardiologist: Jean Huertas MD      CHIEF COMPLAINT:  Chief Complaint   Patient presents with    Congestive Heart Failure    Follow-up        CURRENT VISIT 2024   Yaquelin Guzmán presents for HF f/u. No acute HF symptoms.  Denies shortness of breath at rest or with normal activities.  Does get dyspnea on exertion with more moderate activity; however, if she takes it slowly she does okay.  She denies chest pain, palpitations, dizziness, swelling, or syncope.  She is having some paresthesias in her arms and having issues with her carpal tunnel pain.  She has been having a hard time sleeping at night.  She falls asleep easily and then wakes up at 2 AM wide-awake.  She denies problems with breathing, she said she just wakes up awake.  [facility].  As per patient they told her she would have to [facility] all of [facility] care to [facility]  and she does not want to give up all her doctors so she should will hold off at this time.  Her weights and blood pressure have been stable and she is compliant with all medications.      ASSESSMENT:  Yaquelin Guzmán is a 84 y.o. female with a history of HFpEF and recent cardiac MRI suggesting amyloidosis. 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     PLAN:  HFpEF: NYHA class II   SGLT2i (class 2a): Continue Farxiga 10 mg daily  ARNi/ARB (class 2b): Generally not well tolerated in the setting of amyloid. Will hold  MRA (class 2b): Baseline hyperkalemia, did not start  Diuretics: Continue Lasix PRN   Prescribed Amioderone by Dr. Rivera as VCS-->will request

## 2024-11-14 ENCOUNTER — TELEPHONE (OUTPATIENT)
Age: 84
End: 2024-11-14

## 2024-11-14 NOTE — TELEPHONE ENCOUNTER
Call placed to [facility] to request Ms. Guzmán's last office note with Dr. Rivera. I was informed that Ms. Guzmán did  attend appointment scheduled 10/22 and has not been seen since March 2024.   - That office note was sent to [facility] and is in her chart    Call placed to [facility] to request recent lab results. VM message left with [facility] fax number along with office number.

## 2025-01-27 ENCOUNTER — TELEPHONE (OUTPATIENT)
Age: 85
End: 2025-01-27

## 2025-01-27 NOTE — TELEPHONE ENCOUNTER
Received letter from [employer] that patient is only able to have 30 day fill for prescription for dapaglifozin (farxiga) prescription. Tried to call insurance company through listed numbers and provided numbers of [phone], [phone], and [phone]. The insurance company routed call 4 plus times then was told by representative they were unsure how to help and to verify coverage. They noted during transferred calls it was hard to tell which was responsible for [place] [facility] vs [facility].       Called patients listed pharmacy and verifed coverage as follows:   N [id number]   BIN [id number]  ID [id number]  GROUP [id number]     Tried to completed prior authorization through covermymeds which listed \"The patient currently has access to the requested medication and a Prior Authorization is not needed for the patient/medication\". Called patients listed pharmacy. They state that the medication is running for a paid claim and prior authorization is not needed at this time.

## 2025-02-18 ENCOUNTER — TELEPHONE (OUTPATIENT)
Age: 85
End: 2025-02-18

## 2025-02-18 NOTE — TELEPHONE ENCOUNTER
Pt's niece called to reschedule 2/19/25 appt due to weather.  She asked to have it reschedule the wk of 3/10/25.  Appt is now Mon, 3/10/25 at 3:00 p.m. with Dr. Colletti.

## 2025-02-19 RX ORDER — TAFAMIDIS 61 MG/1
CAPSULE, LIQUID FILLED ORAL
Qty: 30 CAPSULE | Refills: 2 | Status: SHIPPED | OUTPATIENT
Start: 2025-02-19

## 2025-02-19 NOTE — TELEPHONE ENCOUNTER
Requested Prescriptions     Pending Prescriptions Disp Refills    Tafamidis (VYNDAMAX) 61 MG CAPS [Pharmacy Med Name: VYNDAMAX 61MG] 30 capsule 2     Sig: TAKE 1 CAPSULE BY MOUTH 1 TIME A DAY      Last appt 11/13  Next appt 3/10

## 2025-03-06 ENCOUNTER — TELEPHONE (OUTPATIENT)
Age: 85
End: 2025-03-06

## 2025-03-06 NOTE — TELEPHONE ENCOUNTER
Telephone Call RE:  Appointment reminder     Outcome:     [x] Patient confirmed appointment   [] Patient rescheduled appointment for    [] Unable to reach  [] Left message              [x] Other: Parking, Location

## 2025-03-10 ENCOUNTER — OFFICE VISIT (OUTPATIENT)
Age: 85
End: 2025-03-10
Payer: MEDICARE

## 2025-03-10 ENCOUNTER — TELEPHONE (OUTPATIENT)
Age: 85
End: 2025-03-10

## 2025-03-10 VITALS
OXYGEN SATURATION: 94 % | WEIGHT: 142.4 LBS | DIASTOLIC BLOOD PRESSURE: 74 MMHG | HEIGHT: 59 IN | HEART RATE: 77 BPM | RESPIRATION RATE: 18 BRPM | BODY MASS INDEX: 28.71 KG/M2 | SYSTOLIC BLOOD PRESSURE: 162 MMHG

## 2025-03-10 DIAGNOSIS — I50.32 CHRONIC DIASTOLIC HF (HEART FAILURE) (HCC): ICD-10-CM

## 2025-03-10 DIAGNOSIS — I50.22 CHRONIC HFREF (HEART FAILURE WITH REDUCED EJECTION FRACTION) (HCC): ICD-10-CM

## 2025-03-10 DIAGNOSIS — I42.9 CARDIOMYOPATHY, UNSPECIFIED TYPE (HCC): Primary | ICD-10-CM

## 2025-03-10 DIAGNOSIS — I48.91 ATRIAL FIBRILLATION, UNSPECIFIED TYPE (HCC): ICD-10-CM

## 2025-03-10 PROCEDURE — 3077F SYST BP >= 140 MM HG: CPT | Performed by: NURSE PRACTITIONER

## 2025-03-10 PROCEDURE — 3078F DIAST BP <80 MM HG: CPT | Performed by: NURSE PRACTITIONER

## 2025-03-10 PROCEDURE — 99214 OFFICE O/P EST MOD 30 MIN: CPT | Performed by: NURSE PRACTITIONER

## 2025-03-10 PROCEDURE — 1123F ACP DISCUSS/DSCN MKR DOCD: CPT | Performed by: NURSE PRACTITIONER

## 2025-03-10 RX ORDER — HYDROXYUREA 500 MG/1
CAPSULE ORAL DAILY
COMMUNITY
Start: 2025-01-20

## 2025-03-10 RX ORDER — FERROUS SULFATE 325(65) MG
325 TABLET, DELAYED RELEASE (ENTERIC COATED) ORAL
COMMUNITY

## 2025-03-10 ASSESSMENT — ENCOUNTER SYMPTOMS
COUGH: 0
VOMITING: 0
SHORTNESS OF BREATH: 0
NAUSEA: 0

## 2025-03-10 ASSESSMENT — PATIENT HEALTH QUESTIONNAIRE - PHQ9
SUM OF ALL RESPONSES TO PHQ QUESTIONS 1-9: 0
SUM OF ALL RESPONSES TO PHQ QUESTIONS 1-9: 0
2. FEELING DOWN, DEPRESSED OR HOPELESS: NOT AT ALL
SUM OF ALL RESPONSES TO PHQ QUESTIONS 1-9: 0
SUM OF ALL RESPONSES TO PHQ QUESTIONS 1-9: 0
1. LITTLE INTEREST OR PLEASURE IN DOING THINGS: NOT AT ALL

## 2025-03-10 NOTE — PROGRESS NOTES
(VYNDAMAX) 61 MG CAPS, TAKE 1 CAPSULE BY MOUTH 1 TIME A DAY, Disp: 30 capsule, Rfl: 2    dapagliflozin (FARXIGA) 10 MG tablet, Take 1 tablet by mouth every morning, Disp: 90 tablet, Rfl: 1    amLODIPine (NORVASC) 10 MG tablet, Take 1 tablet by mouth daily, Disp: 90 tablet, Rfl: 1    furosemide (LASIX) 20 MG tablet, Take 1 tablet by mouth as needed (weight gain>3lbs/day or 5lbs/week) In place of Hydrochlorothiazide, Disp: 90 tablet, Rfl: 1    rosuvastatin (CRESTOR) 5 MG tablet, One pill every Monday and Thursday, Disp: 24 tablet, Rfl: 3    amiodarone (CORDARONE) 200 MG tablet, Take 1 tablet by mouth daily, Disp: , Rfl:     [facility] ASPIRIN LOW STRENGTH 81 MG EC tablet, TAKE 1 TABLET BY MOUTH EVERY DAY, Disp: 90 tablet, Rfl: 3    metFORMIN (GLUCOPHAGE) 500 MG tablet, TAKE 1 TABLET BY MOUTH EVERY DAY IN THE MORNING, Disp: 90 tablet, Rfl: 3    Coenzyme Q10 (COQ10) 100 MG CAPS, Take 100 mg by mouth daily, Disp: , Rfl:     loratadine (CLARITIN) 10 MG tablet, Take 1 tablet by mouth daily, Disp: 30 tablet, Rfl: 11    meclizine (ANTIVERT) 12.5 MG tablet, Take 1 tablet by mouth 3 times daily as needed for Dizziness, Disp: 30 tablet, Rfl: 3    Multiple Vitamins-Minerals (CENTRUM SILVER PO), Take by mouth One pill daily, Disp: , Rfl:     famotidine (PEPCID) 20 MG tablet, TAKE 1 TABLET BY MOUTH TWICE A DAY, Disp: 180 tablet, Rfl: 1    Cholecalciferol (VITAMIN D3) 50 MCG (2000 UT) CAPS, TAKE 1 CAPSULE BY MOUTH EVERY DAY, Disp: 90 capsule, Rfl: 3    Thank you for your referral and allowing me to participate in this patient's care.    ALVA Kumar - NP   [facility]  [facility]  [address] [place] [address], [address]  Phone: [phone]      PATIENT CARE TEAM:  Patient Care Team:  FRANKIE Mendez IV, MD as PCP - General    On this date 3/10/25 , I have spent a total time of  35 minutes personally reviewing new vitals, test results, notes from recent visits, face to face encounter/physical exam of

## 2025-03-10 NOTE — PATIENT INSTRUCTIONS
Medication changes:    Resume taking amlodipine 5mg daily     Contact [facility] in 2 weeks (by MyChart -preferable- or telephone) with your daily weights, blood pressures and any symptoms. If you leave a voicemail, please report this information in detail so we can follow up appropriately and efficiently. Be reassured, we will call you back. Phone number is [phone] option 2    Please take this to your pharmacy to notify them of the change in medications.     Testing Ordered:      Please call care coordination to schedule echo for april at [phone]    Referrals:      Other Recommendations:      - Record blood pressure and heart rate daily before medication and two hours after medication  - Record weight daily upon waking/after using the bathroom.   - Keep a written records of your weights and blood pressure and bring to your next appointment.   - Call the clinic at if you have a weight gain of 3 or more pounds overnight OR 5 or more pounds in one week, new/worsened shortness of breath or swelling, or if your blood pressure begins to consistently run below 90/60 and/or you begin to experience dizziness or lightheadedness. Our office phone number [phone] option 2.  - Ensure you are drinking an adequate amount of water with a goal of 6-8 eight ounce glasses (1.5-2 liters) of fluid daily. Your urine should be clear and light yellow straw colored.       Follow up 6 months with NP  with [facility]    Our monthly Heart Failure Support Group is held on the last Wednesday of every month from 5-6pm at [facility]. If you would like to attend, please RSVP to [email or web address]    Thank you for allowing us the privilege of being a part of your healthcare team! Please do not hesitate to contact our office at [phone] option 2 with any questions or concerns.

## 2025-07-21 ENCOUNTER — TELEPHONE (OUTPATIENT)
Age: 85
End: 2025-07-21

## 2025-07-21 NOTE — TELEPHONE ENCOUNTER
Spoke w/ pt regarding scheduling her 6 month f/u in Sept.  She said that she would call me back b/c she had a workman at her house.  She took down my number.

## 2025-08-13 RX ORDER — DAPAGLIFLOZIN 10 MG/1
10 TABLET, FILM COATED ORAL EVERY MORNING
Qty: 90 TABLET | Refills: 0 | Status: SHIPPED | OUTPATIENT
Start: 2025-08-13

## 2025-08-14 RX ORDER — TAFAMIDIS 61 MG/1
61 CAPSULE, LIQUID FILLED ORAL DAILY
Qty: 30 CAPSULE | Refills: 3 | Status: SHIPPED | OUTPATIENT
Start: 2025-08-14

## 2025-08-28 RX ORDER — DAPAGLIFLOZIN 10 MG/1
10 TABLET, FILM COATED ORAL EVERY MORNING
Qty: 90 TABLET | Refills: 0 | Status: SHIPPED | OUTPATIENT
Start: 2025-08-28

## 2025-08-29 ENCOUNTER — TELEPHONE (OUTPATIENT)
Age: 85
End: 2025-08-29